# Patient Record
Sex: MALE | Race: WHITE | NOT HISPANIC OR LATINO | Employment: UNEMPLOYED | URBAN - METROPOLITAN AREA
[De-identification: names, ages, dates, MRNs, and addresses within clinical notes are randomized per-mention and may not be internally consistent; named-entity substitution may affect disease eponyms.]

---

## 2024-09-23 ENCOUNTER — HOSPITAL ENCOUNTER (EMERGENCY)
Facility: HOSPITAL | Age: 39
Discharge: HOME | End: 2024-09-23
Attending: EMERGENCY MEDICINE | Admitting: EMERGENCY MEDICINE
Payer: COMMERCIAL

## 2024-09-23 ENCOUNTER — APPOINTMENT (EMERGENCY)
Dept: RADIOLOGY | Facility: HOSPITAL | Age: 39
End: 2024-09-23
Payer: COMMERCIAL

## 2024-09-23 VITALS
BODY MASS INDEX: 33.97 KG/M2 | SYSTOLIC BLOOD PRESSURE: 165 MMHG | RESPIRATION RATE: 18 BRPM | TEMPERATURE: 97.4 F | WEIGHT: 250.5 LBS | HEART RATE: 88 BPM | OXYGEN SATURATION: 97 % | DIASTOLIC BLOOD PRESSURE: 106 MMHG

## 2024-09-23 DIAGNOSIS — J06.9 UPPER RESPIRATORY INFECTION: Primary | ICD-10-CM

## 2024-09-23 LAB
FLUAV AG UPPER RESP QL IA.RAPID: NEGATIVE
FLUBV AG UPPER RESP QL IA.RAPID: NEGATIVE
SARS-COV+SARS-COV-2 AG RESP QL IA.RAPID: NEGATIVE

## 2024-09-23 PROCEDURE — 99283 EMERGENCY DEPT VISIT LOW MDM: CPT

## 2024-09-23 PROCEDURE — 99284 EMERGENCY DEPT VISIT MOD MDM: CPT | Performed by: EMERGENCY MEDICINE

## 2024-09-23 PROCEDURE — 71045 X-RAY EXAM CHEST 1 VIEW: CPT

## 2024-09-23 PROCEDURE — 87811 SARS-COV-2 COVID19 W/OPTIC: CPT | Performed by: EMERGENCY MEDICINE

## 2024-09-23 PROCEDURE — 87804 INFLUENZA ASSAY W/OPTIC: CPT | Performed by: EMERGENCY MEDICINE

## 2024-09-23 RX ORDER — ACETAMINOPHEN 325 MG/1
650 TABLET ORAL ONCE
Status: COMPLETED | OUTPATIENT
Start: 2024-09-23 | End: 2024-09-23

## 2024-09-23 RX ORDER — GUAIFENESIN/DEXTROMETHORPHAN 100-10MG/5
5 SYRUP ORAL 3 TIMES DAILY PRN
Qty: 118 ML | Refills: 0 | Status: SHIPPED | OUTPATIENT
Start: 2024-09-23

## 2024-09-23 RX ORDER — ACETAMINOPHEN 325 MG/1
650 TABLET ORAL EVERY 6 HOURS PRN
Qty: 30 TABLET | Refills: 0 | Status: SHIPPED | OUTPATIENT
Start: 2024-09-23

## 2024-09-23 RX ORDER — GUAIFENESIN/DEXTROMETHORPHAN 100-10MG/5
10 SYRUP ORAL ONCE
Status: COMPLETED | OUTPATIENT
Start: 2024-09-23 | End: 2024-09-23

## 2024-09-23 RX ADMIN — ACETAMINOPHEN 650 MG: 325 TABLET, FILM COATED ORAL at 20:15

## 2024-09-23 RX ADMIN — GUAIFENESIN AND DEXTROMETHORPHAN 10 ML: 100; 10 SYRUP ORAL at 20:15

## 2024-09-23 NOTE — Clinical Note
Shun Linda was seen and treated in our emergency department on 9/23/2024.                Diagnosis:     Shun  may return to work on return date.    He may return on this date: 09/25/2024         If you have any questions or concerns, please don't hesitate to call.      Mylene Sheth, DO    ______________________________           _______________          _______________  Hospital Representative                              Date                                Time

## 2024-09-24 NOTE — ED PROVIDER NOTES
1. Upper respiratory infection      ED Disposition       ED Disposition   Discharge    Condition   Stable    Date/Time   Mon Sep 23, 2024  9:08 PM    Comment   Shun Linda discharge to home/self care.                   Assessment & Plan       Medical Decision Making  Differential diagnosis includes URI, COVID, flu, pneumonia    Problems Addressed:  Upper respiratory infection: acute illness or injury    Amount and/or Complexity of Data Reviewed  Labs: ordered. Decision-making details documented in ED Course.  Radiology: ordered and independent interpretation performed.     Details: Chest x-ray within normal limits no pneumonia    Risk  OTC drugs.  Prescription drug management.  Risk Details: Wrote patient prescription for cough medicine and Tylenol.                     Medications   dextromethorphan-guaiFENesin (ROBITUSSIN DM) oral syrup 10 mL (10 mL Oral Given 9/23/24 2015)   acetaminophen (TYLENOL) tablet 650 mg (650 mg Oral Given 9/23/24 2015)       History of Present Illness       HPI    Review of Systems        Objective     ED Triage Vitals   Temperature Pulse Blood Pressure Respirations SpO2 Patient Position - Orthostatic VS   09/23/24 2002 09/23/24 2002 09/23/24 2002 09/23/24 2002 09/23/24 2002 09/23/24 2002   (!) 97.4 °F (36.3 °C) 88 (!) 165/106 18 97 % Sitting      Temp Source Heart Rate Source BP Location FiO2 (%) Pain Score    09/23/24 2002 09/23/24 2002 09/23/24 2002 -- 09/23/24 2001    Oral Monitor Left arm  No Pain        Physical Exam    Labs Reviewed   COVID-19/INFLUENZA A/B RAPID ANTIGEN (30 MIN.TAT) - Normal       Result Value    SARS COV Rapid Antigen Negative      Influenza A Rapid Antigen Negative      Influenza B Rapid Antigen Negative      Narrative:     This test has been performed using the [employer] [facility] 2 FLU+SARS Antigen test under the Emergency Use Authorization (EUA). This test has been validated by the  and verified by the performing laboratory. The Lynn uses lateral  flow immunofluorescent sandwich assay to detect SARS-COV, Influenza A and Influenza B Antigen.     The Quidel Lynn 2 SARS Antigen test does not differentiate between SARS-CoV and SARS-CoV-2.     Negative results are presumptive and may be confirmed with a molecular assay, if necessary, for patient management. Negative results do not rule out SARS-CoV-2 or influenza infection and should not be used as the sole basis for treatment or patient management decisions. A negative test result may occur if the level of antigen in a sample is below the limit of detection of this test.     Positive results are indicative of the presence of viral antigens, but do not rule out bacterial infection or co-infection with other viruses.     All test results should be used as an adjunct to clinical observations and other information available to the provider.    FOR PEDIATRIC PATIENTS - copy/paste COVID Guidelines URL to browser: [email or web address]     XR chest 1 view portable    (Results Pending)       Procedures    ED Medication and Procedure Management   Prior to Admission Medications   Prescriptions Last Dose Informant Patient Reported? Taking?   ALPRAZolam (XANAX) 2 MG tablet   Yes No   Sig: Take 2 mg by mouth 3 (three) times a day as needed for anxiety.   asenapine (SAPHRIS) 10 mg SL tablet   Yes No   Sig: Place 5 mg under the tongue daily at bedtime as needed.   divalproex sodium (DEPAKOTE ER) 500 mg 24 hr tablet   Yes No   Sig: Take 1,500 mg by mouth daily at bedtime.   lisinopril (ZESTRIL) 5 mg tablet   Yes No   Sig: Take 5 mg by mouth daily.      Facility-Administered Medications: None     Discharge Medication List as of 9/23/2024  9:09 PM        START taking these medications    Details   dextromethorphan-guaiFENesin (ROBITUSSIN DM)  mg/5 mL syrup Take 5 mL by mouth 3 (three) times a day as needed for congestion or cough, Starting Mon 9/23/2024, Normal            CONTINUE these medications which have NOT CHANGED    Details   ALPRAZolam (XANAX) 2 MG tablet Take 2 mg by mouth 3 (three) times a day as needed for anxiety., Until Discontinued, Historical Med      asenapine (SAPHRIS) 10 mg SL tablet Place 5 mg under the tongue daily at bedtime as needed., Historical Med      divalproex sodium (DEPAKOTE ER) 500 mg 24 hr tablet Take 1,500 mg by mouth daily at bedtime., Until Discontinued, Historical Med      lisinopril (ZESTRIL) 5 mg tablet Take 5 mg by mouth daily., Historical Med           No discharge procedures on file.     Mylene Sheth, DO  09/23/24 [id number]

## 2025-06-18 ENCOUNTER — HOSPITAL ENCOUNTER (INPATIENT)
Facility: HOSPITAL | Age: 40
LOS: 2 days | Discharge: HOME | End: 2025-06-20
Attending: EMERGENCY MEDICINE | Admitting: STUDENT IN AN ORGANIZED HEALTH CARE EDUCATION/TRAINING PROGRAM
Payer: COMMERCIAL

## 2025-06-18 ENCOUNTER — APPOINTMENT (EMERGENCY)
Dept: ULTRASOUND IMAGING | Facility: HOSPITAL | Age: 40
End: 2025-06-18
Payer: COMMERCIAL

## 2025-06-18 ENCOUNTER — APPOINTMENT (EMERGENCY)
Dept: CT IMAGING | Facility: HOSPITAL | Age: 40
End: 2025-06-18
Payer: COMMERCIAL

## 2025-06-18 DIAGNOSIS — E11.9 DIABETES (HCC): ICD-10-CM

## 2025-06-18 DIAGNOSIS — E78.1 HYPERTRIGLYCERIDEMIA: ICD-10-CM

## 2025-06-18 DIAGNOSIS — K21.9 GERD (GASTROESOPHAGEAL REFLUX DISEASE): ICD-10-CM

## 2025-06-18 DIAGNOSIS — E11.10 DKA (DIABETIC KETOACIDOSIS) (HCC): Primary | ICD-10-CM

## 2025-06-18 DIAGNOSIS — D68.51 FACTOR 5 LEIDEN MUTATION, HETEROZYGOUS (HCC): Chronic | ICD-10-CM

## 2025-06-18 DIAGNOSIS — I10 PRIMARY HYPERTENSION: Chronic | ICD-10-CM

## 2025-06-18 PROBLEM — E87.20 METABOLIC ACIDOSIS: Status: ACTIVE | Noted: 2025-06-18

## 2025-06-18 PROBLEM — E87.1 HYPONATREMIA: Status: ACTIVE | Noted: 2025-06-18

## 2025-06-18 PROBLEM — F14.90 COCAINE USE: Status: ACTIVE | Noted: 2025-06-18

## 2025-06-18 PROBLEM — F14.90 COCAINE USE: Chronic | Status: ACTIVE | Noted: 2025-06-18

## 2025-06-18 PROBLEM — R10.9 ABDOMINAL PAIN: Status: ACTIVE | Noted: 2025-06-18

## 2025-06-18 PROBLEM — K76.0 HEPATIC STEATOSIS: Status: ACTIVE | Noted: 2025-06-18

## 2025-06-18 PROBLEM — E66.9 OBESITY: Status: ACTIVE | Noted: 2025-06-18

## 2025-06-18 PROBLEM — E66.9 OBESITY: Chronic | Status: ACTIVE | Noted: 2025-06-18

## 2025-06-18 PROBLEM — D72.829 LEUKOCYTOSIS: Status: ACTIVE | Noted: 2025-06-18

## 2025-06-18 LAB
ANION GAP SERPL CALCULATED.3IONS-SCNC: 11 MMOL/L (ref 4–13)
ANION GAP SERPL CALCULATED.3IONS-SCNC: 12 MMOL/L (ref 4–13)
ANION GAP SERPL CALCULATED.3IONS-SCNC: 15 MMOL/L (ref 4–13)
ANION GAP SERPL CALCULATED.3IONS-SCNC: 20 MMOL/L (ref 4–13)
B-OH-BUTYR SERPL-MCNC: 4.71 MMOL/L (ref 0.2–0.6)
BACTERIA UR QL AUTO: NORMAL /HPF
BASE EX.OXY STD BLDV CALC-SCNC: 95.8 % (ref 60–80)
BASE EXCESS BLDV CALC-SCNC: -11.6 MMOL/L
BASOPHILS # BLD AUTO: 0.14 THOUSANDS/ÂΜL (ref 0–0.1)
BASOPHILS NFR BLD AUTO: 1 % (ref 0–1)
BILIRUB UR QL STRIP: ABNORMAL
BUN SERPL-MCNC: 11 MG/DL (ref 5–25)
BUN SERPL-MCNC: 12 MG/DL (ref 5–25)
CALCIUM SERPL-MCNC: 8.3 MG/DL (ref 8.4–10.2)
CALCIUM SERPL-MCNC: 8.9 MG/DL (ref 8.4–10.2)
CALCIUM SERPL-MCNC: 8.9 MG/DL (ref 8.4–10.2)
CALCIUM SERPL-MCNC: 9.3 MG/DL (ref 8.4–10.2)
CHLORIDE SERPL-SCNC: 102 MMOL/L (ref 96–108)
CHLORIDE SERPL-SCNC: 104 MMOL/L (ref 96–108)
CHLORIDE SERPL-SCNC: 104 MMOL/L (ref 96–108)
CHLORIDE SERPL-SCNC: 99 MMOL/L (ref 96–108)
CHOLEST SERPL-MCNC: 277 MG/DL (ref ?–200)
CLARITY UR: CLEAR
CO2 SERPL-SCNC: 13 MMOL/L (ref 21–32)
CO2 SERPL-SCNC: 14 MMOL/L (ref 21–32)
CO2 SERPL-SCNC: 17 MMOL/L (ref 21–32)
CO2 SERPL-SCNC: 18 MMOL/L (ref 21–32)
COLOR UR: YELLOW
CREAT SERPL-MCNC: 0.98 MG/DL (ref 0.6–1.3)
CREAT SERPL-MCNC: 1.02 MG/DL (ref 0.6–1.3)
CREAT SERPL-MCNC: 1.06 MG/DL (ref 0.6–1.3)
CREAT SERPL-MCNC: 1.18 MG/DL (ref 0.6–1.3)
EOSINOPHIL # BLD AUTO: 0.01 THOUSAND/ÂΜL (ref 0–0.61)
EOSINOPHIL NFR BLD AUTO: 0 % (ref 0–6)
ERYTHROCYTE [DISTWIDTH] IN BLOOD BY AUTOMATED COUNT: 11.5 % (ref 11.6–15.1)
EST. AVERAGE GLUCOSE BLD GHB EST-MCNC: 286 MG/DL
GFR SERPL CREATININE-BSD FRML MDRD: 76 ML/MIN/1.73SQ M
GFR SERPL CREATININE-BSD FRML MDRD: 86 ML/MIN/1.73SQ M
GFR SERPL CREATININE-BSD FRML MDRD: 90 ML/MIN/1.73SQ M
GFR SERPL CREATININE-BSD FRML MDRD: 95 ML/MIN/1.73SQ M
GLUCOSE SERPL-MCNC: 109 MG/DL (ref 65–140)
GLUCOSE SERPL-MCNC: 130 MG/DL (ref 65–140)
GLUCOSE SERPL-MCNC: 156 MG/DL (ref 65–140)
GLUCOSE SERPL-MCNC: 190 MG/DL (ref 65–140)
GLUCOSE SERPL-MCNC: 190 MG/DL (ref 65–140)
GLUCOSE SERPL-MCNC: 193 MG/DL (ref 65–140)
GLUCOSE SERPL-MCNC: 195 MG/DL (ref 65–140)
GLUCOSE SERPL-MCNC: 201 MG/DL (ref 65–140)
GLUCOSE SERPL-MCNC: 203 MG/DL (ref 65–140)
GLUCOSE SERPL-MCNC: 209 MG/DL (ref 65–140)
GLUCOSE SERPL-MCNC: 215 MG/DL (ref 65–140)
GLUCOSE SERPL-MCNC: 243 MG/DL (ref 65–140)
GLUCOSE SERPL-MCNC: 267 MG/DL (ref 65–140)
GLUCOSE SERPL-MCNC: 268 MG/DL (ref 65–140)
GLUCOSE SERPL-MCNC: 370 MG/DL (ref 65–140)
GLUCOSE SERPL-MCNC: 371 MG/DL (ref 65–140)
GLUCOSE SERPL-MCNC: 76 MG/DL (ref 65–140)
GLUCOSE SERPL-MCNC: 79 MG/DL (ref 65–140)
GLUCOSE SERPL-MCNC: 81 MG/DL (ref 65–140)
GLUCOSE UR STRIP-MCNC: ABNORMAL MG/DL
HBA1C MFR BLD: 11.6 %
HCO3 BLDV-SCNC: 13.3 MMOL/L (ref 24–30)
HCT VFR BLD AUTO: 47 % (ref 36.5–49.3)
HDLC SERPL-MCNC: 23 MG/DL
HGB BLD-MCNC: 16.3 G/DL (ref 12–17)
HGB UR QL STRIP.AUTO: ABNORMAL
IMM GRANULOCYTES # BLD AUTO: 0.19 THOUSAND/UL (ref 0–0.2)
IMM GRANULOCYTES NFR BLD AUTO: 1 % (ref 0–2)
KETONES UR STRIP-MCNC: ABNORMAL MG/DL
LACTATE SERPL-SCNC: 1.9 MMOL/L (ref 0.5–2)
LEUKOCYTE ESTERASE UR QL STRIP: NEGATIVE
LIPASE SERPL-CCNC: 74 U/L (ref 11–82)
LYMPHOCYTES # BLD AUTO: 2.13 THOUSANDS/ÂΜL (ref 0.6–4.47)
LYMPHOCYTES NFR BLD AUTO: 13 % (ref 14–44)
MAGNESIUM SERPL-MCNC: 1.8 MG/DL (ref 1.9–2.7)
MAGNESIUM SERPL-MCNC: 1.9 MG/DL (ref 1.9–2.7)
MAGNESIUM SERPL-MCNC: 2 MG/DL (ref 1.9–2.7)
MCH RBC QN AUTO: 31.5 PG (ref 26.8–34.3)
MCHC RBC AUTO-ENTMCNC: 34.7 G/DL (ref 31.4–37.4)
MCV RBC AUTO: 91 FL (ref 82–98)
MONOCYTES # BLD AUTO: 1.15 THOUSAND/ÂΜL (ref 0.17–1.22)
MONOCYTES NFR BLD AUTO: 7 % (ref 4–12)
NEUTROPHILS # BLD AUTO: 12.6 THOUSANDS/ÂΜL (ref 1.85–7.62)
NEUTS SEG NFR BLD AUTO: 78 % (ref 43–75)
NITRITE UR QL STRIP: NEGATIVE
NON-SQ EPI CELLS URNS QL MICRO: NORMAL /HPF
NONHDLC SERPL-MCNC: 254 MG/DL
NRBC BLD AUTO-RTO: 0 /100 WBCS
O2 CT BLDV-SCNC: 22.7 ML/DL
PCO2 BLDV: 28.8 MM HG (ref 42–50)
PH BLDV: 7.28 [PH] (ref 7.3–7.4)
PH UR STRIP.AUTO: 6 [PH]
PHOSPHATE SERPL-MCNC: 2.3 MG/DL (ref 2.7–4.5)
PHOSPHATE SERPL-MCNC: 2.6 MG/DL (ref 2.7–4.5)
PHOSPHATE SERPL-MCNC: 3.3 MG/DL (ref 2.7–4.5)
PLATELET # BLD AUTO: 360 THOUSANDS/UL (ref 149–390)
PMV BLD AUTO: 11.1 FL (ref 8.9–12.7)
PO2 BLDV: 107.9 MM HG (ref 35–45)
POTASSIUM SERPL-SCNC: 3.6 MMOL/L (ref 3.5–5.3)
POTASSIUM SERPL-SCNC: 3.8 MMOL/L (ref 3.5–5.3)
POTASSIUM SERPL-SCNC: 4.1 MMOL/L (ref 3.5–5.3)
POTASSIUM SERPL-SCNC: 4.3 MMOL/L (ref 3.5–5.3)
PROCALCITONIN SERPL-MCNC: 0.11 NG/ML
PROT UR STRIP-MCNC: ABNORMAL MG/DL
RBC # BLD AUTO: 5.18 MILLION/UL (ref 3.88–5.62)
RBC #/AREA URNS AUTO: NORMAL /HPF
SODIUM SERPL-SCNC: 131 MMOL/L (ref 135–147)
SODIUM SERPL-SCNC: 132 MMOL/L (ref 135–147)
SODIUM SERPL-SCNC: 133 MMOL/L (ref 135–147)
SODIUM SERPL-SCNC: 133 MMOL/L (ref 135–147)
SP GR UR STRIP.AUTO: >=1.03
TRIGL SERPL-MCNC: 1249 MG/DL (ref ?–150)
UROBILINOGEN UR QL STRIP.AUTO: 0.2 E.U./DL
WBC # BLD AUTO: 16.22 THOUSAND/UL (ref 4.31–10.16)
WBC #/AREA URNS AUTO: NORMAL /HPF

## 2025-06-18 PROCEDURE — 96361 HYDRATE IV INFUSION ADD-ON: CPT

## 2025-06-18 PROCEDURE — 99284 EMERGENCY DEPT VISIT MOD MDM: CPT

## 2025-06-18 PROCEDURE — 76870 US EXAM SCROTUM: CPT

## 2025-06-18 PROCEDURE — 87040 BLOOD CULTURE FOR BACTERIA: CPT | Performed by: NURSE PRACTITIONER

## 2025-06-18 PROCEDURE — 80048 BASIC METABOLIC PNL TOTAL CA: CPT | Performed by: EMERGENCY MEDICINE

## 2025-06-18 PROCEDURE — 80061 LIPID PANEL: CPT | Performed by: NURSE PRACTITIONER

## 2025-06-18 PROCEDURE — 83036 HEMOGLOBIN GLYCOSYLATED A1C: CPT | Performed by: NURSE PRACTITIONER

## 2025-06-18 PROCEDURE — 80048 BASIC METABOLIC PNL TOTAL CA: CPT | Performed by: NURSE PRACTITIONER

## 2025-06-18 PROCEDURE — 83735 ASSAY OF MAGNESIUM: CPT | Performed by: NURSE PRACTITIONER

## 2025-06-18 PROCEDURE — 74176 CT ABD & PELVIS W/O CONTRAST: CPT

## 2025-06-18 PROCEDURE — 82805 BLOOD GASES W/O2 SATURATION: CPT | Performed by: EMERGENCY MEDICINE

## 2025-06-18 PROCEDURE — 85025 COMPLETE CBC W/AUTO DIFF WBC: CPT | Performed by: EMERGENCY MEDICINE

## 2025-06-18 PROCEDURE — 99291 CRITICAL CARE FIRST HOUR: CPT | Performed by: STUDENT IN AN ORGANIZED HEALTH CARE EDUCATION/TRAINING PROGRAM

## 2025-06-18 PROCEDURE — 96374 THER/PROPH/DIAG INJ IV PUSH: CPT

## 2025-06-18 PROCEDURE — 82948 REAGENT STRIP/BLOOD GLUCOSE: CPT

## 2025-06-18 PROCEDURE — 82010 KETONE BODYS QUAN: CPT | Performed by: EMERGENCY MEDICINE

## 2025-06-18 PROCEDURE — 84145 PROCALCITONIN (PCT): CPT | Performed by: NURSE PRACTITIONER

## 2025-06-18 PROCEDURE — 81003 URINALYSIS AUTO W/O SCOPE: CPT | Performed by: EMERGENCY MEDICINE

## 2025-06-18 PROCEDURE — 83605 ASSAY OF LACTIC ACID: CPT | Performed by: EMERGENCY MEDICINE

## 2025-06-18 PROCEDURE — 84100 ASSAY OF PHOSPHORUS: CPT | Performed by: NURSE PRACTITIONER

## 2025-06-18 PROCEDURE — 83690 ASSAY OF LIPASE: CPT | Performed by: NURSE PRACTITIONER

## 2025-06-18 PROCEDURE — 99285 EMERGENCY DEPT VISIT HI MDM: CPT | Performed by: EMERGENCY MEDICINE

## 2025-06-18 PROCEDURE — 96375 TX/PRO/DX INJ NEW DRUG ADDON: CPT

## 2025-06-18 PROCEDURE — 81001 URINALYSIS AUTO W/SCOPE: CPT | Performed by: EMERGENCY MEDICINE

## 2025-06-18 PROCEDURE — 36415 COLL VENOUS BLD VENIPUNCTURE: CPT | Performed by: EMERGENCY MEDICINE

## 2025-06-18 RX ORDER — ONDANSETRON 2 MG/ML
4 INJECTION INTRAMUSCULAR; INTRAVENOUS ONCE
Status: COMPLETED | OUTPATIENT
Start: 2025-06-18 | End: 2025-06-18

## 2025-06-18 RX ORDER — ATORVASTATIN CALCIUM 40 MG/1
40 TABLET, FILM COATED ORAL
Status: DISCONTINUED | OUTPATIENT
Start: 2025-06-18 | End: 2025-06-18

## 2025-06-18 RX ORDER — HYDROMORPHONE HCL/PF 1 MG/ML
1 SYRINGE (ML) INJECTION
Status: DISCONTINUED | OUTPATIENT
Start: 2025-06-18 | End: 2025-06-18

## 2025-06-18 RX ORDER — HYDROMORPHONE HCL/PF 1 MG/ML
1 SYRINGE (ML) INJECTION EVERY 4 HOURS PRN
Status: DISCONTINUED | OUTPATIENT
Start: 2025-06-18 | End: 2025-06-19

## 2025-06-18 RX ORDER — KETOROLAC TROMETHAMINE 30 MG/ML
30 INJECTION, SOLUTION INTRAMUSCULAR; INTRAVENOUS EVERY 6 HOURS SCHEDULED
Status: DISCONTINUED | OUTPATIENT
Start: 2025-06-18 | End: 2025-06-18

## 2025-06-18 RX ORDER — FENTANYL CITRATE 50 UG/ML
50 INJECTION, SOLUTION INTRAMUSCULAR; INTRAVENOUS ONCE
Refills: 0 | Status: COMPLETED | OUTPATIENT
Start: 2025-06-18 | End: 2025-06-18

## 2025-06-18 RX ORDER — OXYCODONE HYDROCHLORIDE 5 MG/1
5 TABLET ORAL EVERY 4 HOURS PRN
Refills: 0 | Status: DISCONTINUED | OUTPATIENT
Start: 2025-06-18 | End: 2025-06-19

## 2025-06-18 RX ORDER — LANOLIN ALCOHOL/MO/W.PET/CERES
800 CREAM (GRAM) TOPICAL ONCE
Status: COMPLETED | OUTPATIENT
Start: 2025-06-18 | End: 2025-06-18

## 2025-06-18 RX ORDER — KETOROLAC TROMETHAMINE 30 MG/ML
30 INJECTION, SOLUTION INTRAMUSCULAR; INTRAVENOUS ONCE
Status: COMPLETED | OUTPATIENT
Start: 2025-06-18 | End: 2025-06-18

## 2025-06-18 RX ORDER — POTASSIUM CHLORIDE 1500 MG/1
40 TABLET, EXTENDED RELEASE ORAL ONCE
Status: COMPLETED | OUTPATIENT
Start: 2025-06-18 | End: 2025-06-18

## 2025-06-18 RX ORDER — ACETAMINOPHEN 10 MG/ML
1000 INJECTION, SOLUTION INTRAVENOUS EVERY 6 HOURS SCHEDULED
Status: DISCONTINUED | OUTPATIENT
Start: 2025-06-18 | End: 2025-06-19

## 2025-06-18 RX ORDER — KETOROLAC TROMETHAMINE 30 MG/ML
15 INJECTION, SOLUTION INTRAMUSCULAR; INTRAVENOUS ONCE
Status: COMPLETED | OUTPATIENT
Start: 2025-06-18 | End: 2025-06-18

## 2025-06-18 RX ORDER — ONDANSETRON 2 MG/ML
4 INJECTION INTRAMUSCULAR; INTRAVENOUS EVERY 6 HOURS PRN
Status: DISCONTINUED | OUTPATIENT
Start: 2025-06-18 | End: 2025-06-20 | Stop reason: HOSPADM

## 2025-06-18 RX ORDER — HYDROMORPHONE HCL/PF 1 MG/ML
0.5 SYRINGE (ML) INJECTION ONCE
Status: COMPLETED | OUTPATIENT
Start: 2025-06-18 | End: 2025-06-18

## 2025-06-18 RX ORDER — POTASSIUM CHLORIDE 1500 MG/1
20 TABLET, EXTENDED RELEASE ORAL ONCE
Status: COMPLETED | OUTPATIENT
Start: 2025-06-18 | End: 2025-06-18

## 2025-06-18 RX ORDER — ACETAMINOPHEN 10 MG/ML
1000 INJECTION, SOLUTION INTRAVENOUS ONCE
Status: COMPLETED | OUTPATIENT
Start: 2025-06-18 | End: 2025-06-18

## 2025-06-18 RX ORDER — FENOFIBRATE 145 MG/1
145 TABLET, FILM COATED ORAL DAILY
Status: DISCONTINUED | OUTPATIENT
Start: 2025-06-18 | End: 2025-06-18

## 2025-06-18 RX ORDER — FENTANYL CITRATE 50 UG/ML
25 INJECTION, SOLUTION INTRAMUSCULAR; INTRAVENOUS ONCE
Refills: 0 | Status: COMPLETED | OUTPATIENT
Start: 2025-06-18 | End: 2025-06-18

## 2025-06-18 RX ORDER — DEXTROSE, SODIUM CHLORIDE, SODIUM LACTATE, POTASSIUM CHLORIDE, AND CALCIUM CHLORIDE 5; .6; .31; .03; .02 G/100ML; G/100ML; G/100ML; G/100ML; G/100ML
250 INJECTION, SOLUTION INTRAVENOUS CONTINUOUS
Status: DISCONTINUED | OUTPATIENT
Start: 2025-06-18 | End: 2025-06-18

## 2025-06-18 RX ORDER — KETOROLAC TROMETHAMINE 30 MG/ML
30 INJECTION, SOLUTION INTRAMUSCULAR; INTRAVENOUS EVERY 6 HOURS SCHEDULED
Status: DISCONTINUED | OUTPATIENT
Start: 2025-06-18 | End: 2025-06-19

## 2025-06-18 RX ORDER — SODIUM CHLORIDE, SODIUM LACTATE, POTASSIUM CHLORIDE, CALCIUM CHLORIDE 600; 310; 30; 20 MG/100ML; MG/100ML; MG/100ML; MG/100ML
500 INJECTION, SOLUTION INTRAVENOUS CONTINUOUS
Status: DISCONTINUED | OUTPATIENT
Start: 2025-06-18 | End: 2025-06-18

## 2025-06-18 RX ORDER — SODIUM CHLORIDE, SODIUM LACTATE, POTASSIUM CHLORIDE, CALCIUM CHLORIDE 600; 310; 30; 20 MG/100ML; MG/100ML; MG/100ML; MG/100ML
250 INJECTION, SOLUTION INTRAVENOUS CONTINUOUS
Status: DISCONTINUED | OUTPATIENT
Start: 2025-06-18 | End: 2025-06-18

## 2025-06-18 RX ORDER — SODIUM CHLORIDE, SODIUM GLUCONATE, SODIUM ACETATE, POTASSIUM CHLORIDE, MAGNESIUM CHLORIDE, SODIUM PHOSPHATE, DIBASIC, AND POTASSIUM PHOSPHATE .53; .5; .37; .037; .03; .012; .00082 G/100ML; G/100ML; G/100ML; G/100ML; G/100ML; G/100ML; G/100ML
75 INJECTION, SOLUTION INTRAVENOUS CONTINUOUS
Status: DISCONTINUED | OUTPATIENT
Start: 2025-06-18 | End: 2025-06-19

## 2025-06-18 RX ORDER — CHLORHEXIDINE GLUCONATE ORAL RINSE 1.2 MG/ML
15 SOLUTION DENTAL EVERY 12 HOURS SCHEDULED
Status: DISCONTINUED | OUTPATIENT
Start: 2025-06-18 | End: 2025-06-19

## 2025-06-18 RX ADMIN — ACETAMINOPHEN 1000 MG: 10 INJECTION INTRAVENOUS at 12:57

## 2025-06-18 RX ADMIN — FENTANYL CITRATE 25 MCG: 50 INJECTION INTRAMUSCULAR; INTRAVENOUS at 05:22

## 2025-06-18 RX ADMIN — Medication 2 TABLET: at 13:38

## 2025-06-18 RX ADMIN — HYDROMORPHONE HYDROCHLORIDE 1 MG: 1 INJECTION, SOLUTION INTRAMUSCULAR; INTRAVENOUS; SUBCUTANEOUS at 11:15

## 2025-06-18 RX ADMIN — SODIUM CHLORIDE, SODIUM LACTATE, POTASSIUM CHLORIDE, AND CALCIUM CHLORIDE 500 ML/HR: .6; .31; .03; .02 INJECTION, SOLUTION INTRAVENOUS at 10:23

## 2025-06-18 RX ADMIN — HYDROMORPHONE HYDROCHLORIDE 0.5 MG: 1 INJECTION, SOLUTION INTRAMUSCULAR; INTRAVENOUS; SUBCUTANEOUS at 07:16

## 2025-06-18 RX ADMIN — SODIUM CHLORIDE 1000 ML: 0.9 INJECTION, SOLUTION INTRAVENOUS at 07:06

## 2025-06-18 RX ADMIN — DEXTROSE, SODIUM CHLORIDE, SODIUM LACTATE, POTASSIUM CHLORIDE, AND CALCIUM CHLORIDE 250 ML/HR: 5; .6; .31; .03; .02 INJECTION, SOLUTION INTRAVENOUS at 09:34

## 2025-06-18 RX ADMIN — KETOROLAC TROMETHAMINE 30 MG: 30 INJECTION, SOLUTION INTRAMUSCULAR; INTRAVENOUS at 21:03

## 2025-06-18 RX ADMIN — HYDROMORPHONE HYDROCHLORIDE 1 MG: 1 INJECTION, SOLUTION INTRAMUSCULAR; INTRAVENOUS; SUBCUTANEOUS at 22:04

## 2025-06-18 RX ADMIN — Medication 800 MG: at 11:12

## 2025-06-18 RX ADMIN — FENOFIBRATE 145 MG: 145 TABLET, FILM COATED ORAL at 12:56

## 2025-06-18 RX ADMIN — SODIUM CHLORIDE, SODIUM GLUCONATE, SODIUM ACETATE, POTASSIUM CHLORIDE, MAGNESIUM CHLORIDE, SODIUM PHOSPHATE, DIBASIC, AND POTASSIUM PHOSPHATE 75 ML/HR: .53; .5; .37; .037; .03; .012; .00082 INJECTION, SOLUTION INTRAVENOUS at 18:50

## 2025-06-18 RX ADMIN — SODIUM CHLORIDE 12.7 UNITS/HR: 9 INJECTION, SOLUTION INTRAVENOUS at 08:22

## 2025-06-18 RX ADMIN — HYDROMORPHONE HYDROCHLORIDE 1 MG: 1 INJECTION, SOLUTION INTRAMUSCULAR; INTRAVENOUS; SUBCUTANEOUS at 08:20

## 2025-06-18 RX ADMIN — SODIUM CHLORIDE 1000 ML: 0.9 INJECTION, SOLUTION INTRAVENOUS at 05:23

## 2025-06-18 RX ADMIN — POTASSIUM CHLORIDE 40 MEQ: 1500 TABLET, EXTENDED RELEASE ORAL at 13:38

## 2025-06-18 RX ADMIN — FENTANYL CITRATE 50 MCG: 50 INJECTION INTRAMUSCULAR; INTRAVENOUS at 05:42

## 2025-06-18 RX ADMIN — KETOROLAC TROMETHAMINE 15 MG: 30 INJECTION, SOLUTION INTRAMUSCULAR; INTRAVENOUS at 05:22

## 2025-06-18 RX ADMIN — ATORVASTATIN CALCIUM 40 MG: 40 TABLET, FILM COATED ORAL at 17:12

## 2025-06-18 RX ADMIN — Medication 800 MG: at 17:12

## 2025-06-18 RX ADMIN — OXYCODONE HYDROCHLORIDE 5 MG: 5 TABLET ORAL at 18:14

## 2025-06-18 RX ADMIN — HYDROMORPHONE HYDROCHLORIDE 1 MG: 1 INJECTION, SOLUTION INTRAMUSCULAR; INTRAVENOUS; SUBCUTANEOUS at 14:13

## 2025-06-18 RX ADMIN — KETOROLAC TROMETHAMINE 30 MG: 30 INJECTION, SOLUTION INTRAMUSCULAR; INTRAVENOUS at 13:04

## 2025-06-18 RX ADMIN — DEXTROSE, SODIUM CHLORIDE, SODIUM LACTATE, POTASSIUM CHLORIDE, AND CALCIUM CHLORIDE 250 ML/HR: 5; .6; .31; .03; .02 INJECTION, SOLUTION INTRAVENOUS at 14:12

## 2025-06-18 RX ADMIN — SODIUM PHOSPHATE, MONOBASIC, MONOHYDRATE AND SODIUM PHOSPHATE, DIBASIC, ANHYDROUS 12 MMOL: 142; 276 INJECTION, SOLUTION INTRAVENOUS at 14:12

## 2025-06-18 RX ADMIN — POTASSIUM CHLORIDE 20 MEQ: 1500 TABLET, EXTENDED RELEASE ORAL at 17:12

## 2025-06-18 RX ADMIN — SODIUM CHLORIDE, SODIUM LACTATE, POTASSIUM CHLORIDE, AND CALCIUM CHLORIDE 250 ML/HR: .6; .31; .03; .02 INJECTION, SOLUTION INTRAVENOUS at 15:35

## 2025-06-18 RX ADMIN — POTASSIUM CHLORIDE 20 MEQ: 1500 TABLET, EXTENDED RELEASE ORAL at 11:12

## 2025-06-18 RX ADMIN — ONDANSETRON 4 MG: 2 INJECTION INTRAMUSCULAR; INTRAVENOUS at 05:22

## 2025-06-18 RX ADMIN — INSULIN HUMAN 6 UNITS: 100 INJECTION, SOLUTION PARENTERAL at 06:24

## 2025-06-18 RX ADMIN — Medication 800 MG: at 13:38

## 2025-06-18 RX ADMIN — CHLORHEXIDINE GLUCONATE 15 ML: 1.2 SOLUTION ORAL at 20:05

## 2025-06-18 RX ADMIN — SODIUM CHLORIDE, SODIUM LACTATE, POTASSIUM CHLORIDE, AND CALCIUM CHLORIDE 500 ML/HR: .6; .31; .03; .02 INJECTION, SOLUTION INTRAVENOUS at 07:58

## 2025-06-18 RX ADMIN — ACETAMINOPHEN 1000 MG: 10 INJECTION INTRAVENOUS at 20:06

## 2025-06-18 NOTE — ASSESSMENT & PLAN NOTE
Right side/flank radiating to scrotum  UA 2+blood 3+ketone 2+ protein  Consider kidney stone  LA 1.9  Hydrate/pain mgmnt/strain urine

## 2025-06-18 NOTE — ASSESSMENT & PLAN NOTE
Lab Results   Component Value Date    HGBA1C 5.6 04/16/2019       Recent Labs     06/18/25  0723 06/18/25  0814   POCGLU 370* 268*       Blood Sugar Average: Last 72 hrs:  (P) 319  New onset  BHB 4.71  Check A1C/PIYUSH/islet cell antibx  DKA protocol   Insulin gtt  Serial bmp  Transition to non CC insulin gtt once gap closed x 2

## 2025-06-18 NOTE — H&P
H&P - Critical Care/ICU   Name: Shun Linda 39 y.o. male I MRN: [id number]  Unit/Bed#: ICU 10-01 I Date of Admission: 6/18/2025   Date of Service: 6/18/2025 I Hospital Day: 0       Assessment & Plan  DKA (diabetic ketoacidosis) (HCC)  Lab Results   Component Value Date    HGBA1C 5.6 04/16/2019       Recent Labs     06/18/25  0723 06/18/25  0814   POCGLU 370* 268*       Blood Sugar Average: Last 72 hrs:  (P) 319  New onset  BHB 4.71  Check A1C/PIYUSH/islet cell antibx  DKA protocol   Insulin gtt  Serial bmp  Transition to non CC insulin gtt once gap closed x 2  Abdominal pain  Right side/flank radiating to scrotum  UA 2+blood 3+ketone 2+ protein  Consider kidney stone  LA 1.9  Hydrate/pain mgmnt/strain urine    Hyponatremia  Pseudo  Corrected 136  Metabolic acidosis  2/2 DKA  Trend bmp  Leukocytosis  Likely reactionary  No source  UA pending  CTAP neg  Check BC/PCT monitor off antibx  Cocaine use  Remote  Snorted cocaine this am  Last use 1 year ago  HTN (hypertension)  Currently not taking lisinopril/metoprolol for months  Factor 5 Leiden mutation, heterozygous (HCC)  Hx DVT on eliquis noncompliant x few months will resume  Obesity  Dietary education  Disposition: Stepdown Level 1    History of Present Illness   Shun Linda is a 39 y.o. with PMH HTN, Factor 5 Leiden, drug abuse who presents with acute onset right sided pain radiating to scrotum. Scrotal u/s and CTAP negative. Labs revealed co2 13  BHB 4.7 pH 7.28. Given insulin bolus and CC asked to evaluate patient.     Pt denies fever/chills/nausea/diarrhea/constipation. +vomiting/abd pain/increased thirst drinking 3L soda per day    History obtained from chart review and the patient.  Review of Systems: Review of Systems   Constitutional:  Negative for chills, fatigue, fever and unexpected weight change.   Gastrointestinal:  Positive for abdominal pain and vomiting. Negative for nausea.   Genitourinary:  Positive for testicular pain.    Musculoskeletal:  Positive for back pain.   Neurological: Negative.        Historical Information   Past Medical History:  No date: Anxiety  No date: Bipolar affective disorder (HCC)  No date: Hypertension Past Surgical History:  No date: HERNIA REPAIR  No date: MANDIBLE FRACTURE SURGERY  No date: ORTHOPEDIC SURGERY   Current Outpatient Medications   Medication Instructions    acetaminophen (TYLENOL) 650 mg, Oral, Every 6 hours PRN    ALPRAZolam (XANAX) 2 mg, Oral, 3 times daily PRN    asenapine (SAPHRIS) 5 mg, Sublingual, Daily at bedtime PRN    dextromethorphan-guaiFENesin (ROBITUSSIN DM)  mg/5 mL syrup 5 mL, Oral, 3 times daily PRN    divalproex sodium (DEPAKOTE ER) 1,500 mg, Oral, Daily at bedtime    lisinopril (ZESTRIL) 5 mg, Oral, Daily    Allergies[1]   Social History[2] Family History[3]       Objective :                   Vitals I/O      Most Recent Min/Max in 24hrs   Temp 98.3 °F (36.8 °C) Temp  Min: 98.3 °F (36.8 °C)  Max: 98.3 °F (36.8 °C)   Pulse 105 Pulse  Min: 102  Max: 110   Resp 16 Resp  Min: 16  Max: 20   /97 BP  Min: 156/97  Max: 156/97   O2 Sat 92 % SpO2  Min: 92 %  Max: 94 %    No intake or output data in the 24 hours ending 06/18/25 0851    Diet NPO; Sips of clear liquids    Invasive Monitoring           Physical Exam   Physical Exam  Eyes:      Pupils: Pupils are equal, round, and reactive to light.   Skin:     General: Skin is warm and dry.   HENT:      Head: Normocephalic and atraumatic.      Mouth/Throat:      Mouth: Mucous membranes are moist.   Cardiovascular:      Rate and Rhythm: Normal rate and regular rhythm.      Pulses: Normal pulses.      Heart sounds: Normal heart sounds.   Musculoskeletal:         General: No swelling. Normal range of motion.      Right lower leg: No edema.      Left lower leg: No edema.   Abdominal: General: Bowel sounds are normal. There is no distension.      Palpations: Abdomen is soft.      Tenderness: There is no abdominal tenderness.    Constitutional:       General: He is not in acute distress.  Pulmonary:      Effort: Pulmonary effort is normal. No respiratory distress.      Breath sounds: Normal breath sounds.   Neurological:      General: No focal deficit present.      Mental Status: He is alert and oriented to person, place and time.      GCS: GCS eye subscore is 4. GCS verbal subscore is 5. GCS motor subscore is 6.      Motor: Strength full and intact in all extremities. No motor deficit.          Diagnostic Studies        Lab Results: I have reviewed the following results:     Medications:  Scheduled PRN   chlorhexidine, 15 mL, Q12H SAMANTHA      HYDROmorphone, 1 mg, Q3H PRN       Continuous    dextrose 5% lactated ringer's, 250 mL/hr  insulin regular (HumuLIN R,NovoLIN R) 1 Units/mL in sodium chloride 0.9 % 100 mL infusion, 0.1-30 Units/hr, Last Rate: 12.7 Units/hr (06/18/25 0822)  lactated ringers, 500 mL/hr, Last Rate: 500 mL/hr (06/18/25 0802)   Followed by  lactated ringers, 250 mL/hr         Labs:   CBC    Recent Labs     06/18/25  0523   WBC 16.22*   HGB 16.3   HCT 47.0        BMP    Recent Labs     06/18/25  0523   SODIUM 132*   K 4.3   CL 99   CO2 13*   AGAP 20*   BUN 12   CREATININE 1.18   CALCIUM 9.3       Coags    No recent results     Additional Electrolytes  No recent results       Blood Gas    No recent results  Recent Labs     06/18/25  0601   PHVEN 7.283*   UIO8YUI 28.8*   PO2VEN 107.9*   LSC0GYU 13.3*   BEVEN -11.6   R0YVEKM 95.8*    LFTs  No recent results    Infectious  Recent Labs     06/18/25  0523   PROCALCITONI 0.11     Glucose  Recent Labs     06/18/25  0523   GLUC 371*        Administrative Statements          [1]   Allergies  Allergen Reactions    Azithromycin     Erythromycin    [2]   Social History  Tobacco Use    Smoking status: Former     Current packs/day: 0.25     Types: Cigarettes   Vaping Use    Vaping status: Every Day    Substances: Nicotine   Substance Use Topics    Alcohol use: Not Currently    Drug  use: Yes     Types: Cocaine   [3] No family history on file.

## 2025-06-18 NOTE — ED PROVIDER NOTES
Time reflects when diagnosis was documented in both MDM as applicable and the Disposition within this note       Time User Action Codes Description Comment    6/18/2025  6:55 AM Devante Almazan Add [E11.10] DKA (diabetic ketoacidosis) (HCC)     6/19/2025  7:39 AM Tori Dorsey Add [E78.1] Hypertriglyceridemia           ED Disposition       ED Disposition   Admit    Condition   Stable    Date/Time   Wed Jun 18, 2025  6:55 AM    Comment   Case was discussed with CC and the patient's admission status was agreed to be Admission Status: inpatient status to the service of Dr. Butler .               Assessment & Plan       Medical Decision Making  I have considered a broad diagnosis for abdominal pain that includes: Appendicitis, diverticulitis, colitis, cholecystitis, biliary colic, volvulus, small bowel obstruction, ileus, UTI, gastritis/PUD and other abdominal pathology.    Given the patient's physical exam and work-up today, there is low although not zero suspicion for these diagnoses.  Patient was advised and given appropriate return precautions, including continued or new fever, persistent vomiting, worsening abdominal pain, or any other concerning signs or symptoms especially if there are new.      Problems Addressed:  DKA (diabetic ketoacidosis) (HCC): acute illness or injury    Amount and/or Complexity of Data Reviewed  Labs: ordered. Decision-making details documented in ED Course.  Radiology: ordered.    Risk  OTC drugs.  Prescription drug management.  Decision regarding hospitalization.        ED Course as of 06/19/25 2345   Wed Jun 18, 2025   0537 WBC(!): 16.22   0537 Hemoglobin: 16.3   0556 GLUCOSE(!): 371   0556 ANION GAP(!): 20   0556 Carbon Dioxide(!): 13   0620 pH, Mamadou(!): 7.283   0626 Ketones, UA(!): 80 (3+)       Medications   sodium chloride 0.9 % bolus 1,000 mL (1,000 mL Intravenous New Bag 6/18/25 0523)   ondansetron (ZOFRAN) injection 4 mg (4 mg Intravenous Given 6/18/25 0522)   ketorolac  (TORADOL) injection 15 mg (15 mg Intravenous Given 6/18/25 0522)   fentaNYL injection 25 mcg (25 mcg Intravenous Given 6/18/25 0522)   fentaNYL injection 50 mcg (50 mcg Intravenous Given 6/18/25 0542)   sodium chloride 0.9 % bolus 1,000 mL (1,000 mL Intravenous New Bag 6/18/25 0706)   insulin regular (HumuLIN R,NovoLIN R) injection 6 Units (6 Units Intravenous Given 6/18/25 0624)   HYDROmorphone (DILAUDID) injection 0.5 mg (0.5 mg Intravenous Given 6/18/25 0716)       ED Risk Strat Scores                    No data recorded                            History of Present Illness       Chief Complaint   Patient presents with    Abdominal Pain     RLQ abdominal pain going into testicles, started around 0330 this morning, nausea; states he did 2 lines of cocaine last night- first time in a year       Past Medical History[1]   Past Surgical History[2]   Family History[3]   Social History[4]   E-Cigarette/Vaping    E-Cigarette Use Current Every Day User       E-Cigarette/Vaping Substances    Nicotine Yes     THC No     CBD No     Flavoring No     Other No     Unknown No       I have reviewed and agree with the history as documented.     Shun Linda is a 39 y.o.  year old male  Past Medical History:  No date: Anxiety  No date: Bipolar affective disorder (HCC)  No date: Hypertension  Social History    Tobacco Use      Smoking status: Former        Packs/day: 0.25        Types: Cigarettes    Vaping Use      Vaping status: Every Day        Substances: Nicotine    Alcohol use: Not Currently    Drug use: Yes      Types: Cocaine    Patient presents with:  Abdominal Pain: RLQ abdominal pain going into testicles, started around 0330 this morning, nausea; states he did 2 lines of cocaine last night- first time in a year  Definite nausea but no active vomiting.  Pain is sharp and severe constant.  Localizes to the right lower quadrant with radiation to testicles.  Does not have a history of similar problems in the past.    Does  have history of DVTs and hypertension but is noncompliant with medication.                    History provided by:  Patient   used: No    Abdominal Pain  Associated symptoms: nausea    Associated symptoms: no chest pain, no chills, no cough, no dysuria, no fever, no hematuria, no shortness of breath, no sore throat and no vomiting        Review of Systems   Constitutional:  Negative for chills and fever.   HENT:  Negative for ear pain and sore throat.    Eyes:  Negative for pain and visual disturbance.   Respiratory:  Negative for cough and shortness of breath.    Cardiovascular:  Negative for chest pain and palpitations.   Gastrointestinal:  Positive for abdominal pain and nausea. Negative for vomiting.   Genitourinary:  Positive for testicular pain. Negative for dysuria and hematuria.   Musculoskeletal:  Negative for arthralgias and back pain.   Skin:  Negative for color change and rash.   Neurological:  Negative for seizures and syncope.   All other systems reviewed and are negative.          Objective       ED Triage Vitals   Temperature Pulse Blood Pressure Respirations SpO2 Patient Position - Orthostatic VS   06/18/25 0506 06/18/25 0506 06/18/25 0506 06/18/25 0506 06/18/25 0506 06/18/25 1100   98.3 °F (36.8 °C) (!) 110 156/89 20 94 % Lying      Temp Source Heart Rate Source BP Location FiO2 (%) Pain Score    06/18/25 0903 06/19/25 0727 06/18/25 1100 -- 06/18/25 0506    Temporal Monitor Left arm  10 - Worst Possible Pain      Vitals      Date and Time Temp Pulse SpO2 Resp BP Pain Score FACES Pain Rating User   06/19/25 2120 -- -- -- -- -- 7 --    06/19/25 1532 97.9 °F (36.6 °C) 92 94 % 18 146/68 -- -- NA   06/19/25 1259 -- -- -- -- -- 9 -- HAK   06/19/25 1113 97.5 °F (36.4 °C) 98 96 % 20 140/74 -- -- NA   06/19/25 0830 -- -- -- -- -- 8 -- HAK   06/19/25 0829 -- -- -- -- -- 8 -- HAK   06/19/25 0727 97.2 °F (36.2 °C) 78 95 % 19 137/75 -- -- NA   06/19/25 0617 -- -- -- -- -- 8 -- TS    06/19/25 0430 97.5 °F (36.4 °C) 87 94 % 27 141/63 -- -- MM   06/19/25 0407 -- -- -- -- -- 7 -- TS   06/19/25 0240 -- -- -- -- -- 8 -- TS   06/19/25 0000 96.8 °F (36 °C) -- -- -- -- -- -- [facility]   06/18/25 2204 -- -- -- -- -- 8 -- TS   06/18/25 2103 -- -- -- -- -- 8 -- TS   06/18/25 2000 -- -- -- -- -- 8 -- TS   06/18/25 1921 98 °F (36.7 °C) -- -- -- 126/90 -- -- KG   06/18/25 1900 -- 82 96 % 23 -- -- -- [facility]   06/18/25 1814 -- 76 97 % 34 -- -- -- [facility]   06/18/25 1814 -- -- -- -- -- 9 -- BR   06/18/25 1800 -- 80 95 % 22 -- -- -- [facility]   06/18/25 1700 -- 76 92 % 18 -- -- -- [facility]   06/18/25 1600 -- 79 94 % 21 -- -- -- [facility]   06/18/25 1500 98.5 °F (36.9 °C) -- -- -- 125/71 -- -- MB   06/18/25 1500 -- 87 95 % 23 -- -- -- [facility]   06/18/25 1413 -- -- -- -- -- 10 - Worst Possible Pain -- [facility]   06/18/25 1304 -- -- -- -- -- 9 -- [facility]   06/18/25 1115 -- -- -- -- -- 8 -- DL   06/18/25 1100 98.6 °F (37 °C) -- -- -- 137/83 -- -- MB   06/18/25 1000 -- 108 97 % 24 -- -- -- [facility]   06/18/25 0903 98.1 °F (36.7 °C) -- -- -- -- -- -- MB   06/18/25 0903 -- 116 -- 29 -- -- -- [facility]   06/18/25 0825 -- 98 96 % 29 136/88 -- -- [facility]   06/18/25 0820 -- -- -- -- -- 10 - Worst Possible Pain -- [facility]   06/18/25 0716 -- -- -- -- -- 10 - Worst Possible Pain -- AM   06/18/25 0715 -- 105 92 % 16 156/97 -- -- AM   06/18/25 0657 -- 102 -- -- -- -- -- AM   06/18/25 0542 -- -- -- -- -- 10 - Worst Possible Pain --    06/18/25 0506 98.3 °F (36.8 °C) 110 94 % 20 156/89 10 - Worst Possible Pain -- AF            Physical Exam  Vitals and nursing note reviewed.   Constitutional:       General: He is in acute distress (due to pain / clammy).      Appearance: He is well-developed. He is obese.   HENT:      Head: Normocephalic and atraumatic.     Eyes:      Extraocular Movements: Extraocular movements intact.      Conjunctiva/sclera: Conjunctivae normal.      Pupils: Pupils are equal, round, and reactive to light.       Cardiovascular:      Rate and Rhythm: Normal rate and  regular rhythm.      Heart sounds: No murmur heard.  Pulmonary:      Effort: Pulmonary effort is normal. No respiratory distress.      Breath sounds: Normal breath sounds.   Abdominal:      General: Bowel sounds are normal.      Palpations: Abdomen is soft.      Tenderness: There is abdominal tenderness in the right lower quadrant. There is no guarding or rebound.      Hernia: No hernia is present.     Musculoskeletal:         General: No swelling.      Cervical back: Neck supple.     Skin:     General: Skin is warm and dry.      Capillary Refill: Capillary refill takes less than 2 seconds.     Neurological:      General: No focal deficit present.      Mental Status: He is alert and oriented to person, place, and time.     Psychiatric:         Mood and Affect: Mood normal.         Behavior: Behavior normal.         Results Reviewed       Procedure Component Value Units Date/Time    Basic metabolic panel [[id number]]  (Abnormal) Collected: 06/18/25 1623    Lab Status: Final result Specimen: Blood from Arm, Left Updated: 06/18/25 1650     Sodium 131 mmol/L      Potassium 3.8 mmol/L      Chloride 102 mmol/L      CO2 18 mmol/L      ANION GAP 11 mmol/L      BUN 11 mg/dL      Creatinine 0.98 mg/dL      Glucose 243 mg/dL      Calcium 8.3 mg/dL      eGFR 95 ml/min/1.73sq m     Narrative:      National Kid[employer] Foundation guidelines for Chronic Kidney Disease (CKD):     Stage 1 with normal or high GFR (GFR > 90 mL/min/1.73 square meters)    Stage 2 Mild CKD (GFR = 60-89 mL/min/1.73 square meters)    Stage 3A Moderate CKD (GFR = 45-59 mL/min/1.73 square meters)    Stage 3B Moderate CKD (GFR = 30-44 mL/min/1.73 square meters)    Stage 4 Severe CKD (GFR = 15-29 mL/min/1.73 square meters)    Stage 5 End Stage CKD (GFR <15 mL/min/1.73 square meters)  Note: GFR calculation is accurate only with a steady state creatinine    Magnesium [[id number]]  (Abnormal) Collected: 06/18/25 1623    Lab Status: Final result Specimen: Blood  from Arm, Left Updated: 06/18/25 1650     Magnesium 1.8 mg/dL     Phosphorus [[id number]]  (Normal) Collected: 06/18/25 1623    Lab Status: Final result Specimen: Blood from Arm, Left Updated: 06/18/25 1650     Phosphorus 3.3 mg/dL     Basic metabolic panel [[id number]]  (Abnormal) Collected: 06/18/25 1237    Lab Status: Final result Specimen: Blood from Arm, Left Updated: 06/18/25 1308     Sodium 133 mmol/L      Potassium 3.6 mmol/L      Chloride 104 mmol/L      CO2 17 mmol/L      ANION GAP 12 mmol/L      BUN 12 mg/dL      Creatinine 1.02 mg/dL      Glucose 81 mg/dL      Calcium 8.9 mg/dL      eGFR 90 ml/min/1.73sq m     Narrative:      National Kid[employer] Disease Foundation guidelines for Chronic Kidney Disease (CKD):     Stage 1 with normal or high GFR (GFR > 90 mL/min/1.73 square meters)    Stage 2 Mild CKD (GFR = 60-89 mL/min/1.73 square meters)    Stage 3A Moderate CKD (GFR = 45-59 mL/min/1.73 square meters)    Stage 3B Moderate CKD (GFR = 30-44 mL/min/1.73 square meters)    Stage 4 Severe CKD (GFR = 15-29 mL/min/1.73 square meters)    Stage 5 End Stage CKD (GFR <15 mL/min/1.73 square meters)  Note: GFR calculation is accurate only with a steady state creatinine    Magnesium [[id number]]  (Normal) Collected: 06/18/25 1237    Lab Status: Final result Specimen: Blood from Arm, Left Updated: 06/18/25 1308     Magnesium 1.9 mg/dL     Phosphorus [[id number]]  (Abnormal) Collected: 06/18/25 1237    Lab Status: Final result Specimen: Blood from Arm, Left Updated: 06/18/25 1308     Phosphorus 2.3 mg/dL     Hemoglobin A1C w/ EAG Estimation [[id number]]  (Abnormal) Collected: 06/18/25 0523    Lab Status: Final result Specimen: Blood from Hand, Right Updated: 06/18/25 1249     Hemoglobin A1C 11.6 %       mg/dl     Basic metabolic panel [[id number]]  (Abnormal) Collected: 06/18/25 0920    Lab Status: Final result Specimen: Blood from Arm, Left Updated: 06/18/25 0941     Sodium 133 mmol/L      Potassium 4.1 mmol/L       Chloride 104 mmol/L      CO2 14 mmol/L      ANION GAP 15 mmol/L      BUN 12 mg/dL      Creatinine 1.06 mg/dL      Glucose 190 mg/dL      Calcium 8.9 mg/dL      eGFR 86 ml/min/1.73sq m     Narrative:      National Kid[employer] Disease Foundation guidelines for Chronic Kidney Disease (CKD):     Stage 1 with normal or high GFR (GFR > 90 mL/min/1.73 square meters)    Stage 2 Mild CKD (GFR = 60-89 mL/min/1.73 square meters)    Stage 3A Moderate CKD (GFR = 45-59 mL/min/1.73 square meters)    Stage 3B Moderate CKD (GFR = 30-44 mL/min/1.73 square meters)    Stage 4 Severe CKD (GFR = 15-29 mL/min/1.73 square meters)    Stage 5 End Stage CKD (GFR <15 mL/min/1.73 square meters)  Note: GFR calculation is accurate only with a steady state creatinine    Magnesium [[id number]]  (Normal) Collected: 06/18/25 0920    Lab Status: Final result Specimen: Blood from Arm, Left Updated: 06/18/25 0941     Magnesium 2.0 mg/dL     Phosphorus [[id number]]  (Abnormal) Collected: 06/18/25 0920    Lab Status: Final result Specimen: Blood from Arm, Left Updated: 06/18/25 0941     Phosphorus 2.6 mg/dL     Lipid panel [[id number]]  (Abnormal) Collected: 06/18/25 0523    Lab Status: Final result Specimen: Blood from Hand, Right Updated: 06/18/25 0938     Cholesterol 277 mg/dL      Triglycerides 1,249 mg/dL      HDL, Direct 23 mg/dL      LDL Calculated --     Non-HDL-Chol (CHOL-HDL) 254 mg/dl     Procalcitonin [[id number]]  (Normal) Collected: 06/18/25 0523    Lab Status: Final result Specimen: Blood from Hand, Right Updated: 06/18/25 0844     Procalcitonin 0.11 ng/ml     Urine Microscopic [[id number]]  (Normal) Collected: 06/18/25 0554    Lab Status: Final result Specimen: Urine, Clean Catch Updated: 06/18/25 0729     RBC, UA 0-1 /hpf      WBC, UA None Seen /hpf      Epithelial Cells None Seen /hpf      Bacteria, UA None Seen /hpf     Fingerstick Glucose (POCT) [[id number]]  (Abnormal) Collected: 06/18/25 0782    Lab Status: Final result Specimen: Blood Updated:  06/18/25 0724     POC Glucose 370 mg/dl     Beta Hydroxybutyrate [[id number]]  (Abnormal) Collected: 06/18/25 0523    Lab Status: Final result Specimen: Blood from Hand, Right Updated: 06/18/25 0641     Beta- Hydroxybutyrate 4.71 mmol/L     UA w Reflex to Microscopic w Reflex to Culture [[id number]]  (Abnormal) Collected: 06/18/25 0554    Lab Status: Final result Specimen: Urine, Clean Catch Updated: 06/18/25 0632     Color, UA Yellow     Clarity, UA Clear     Specific Gravity, UA >=1.030     pH, UA 6.0     Leukocytes, UA Negative     Nitrite, UA Negative     Protein, UA 2+ mg/dl      Glucose, UA 3+ mg/dl      Ketones, UA 80 (3+) mg/dl      Urobilinogen, UA 0.2 E.U./dl      Bilirubin, UA 1+     Occult Blood, UA 2+    Lactic acid, plasma (w/reflex if result > 2.0) [[id number]]  (Normal) Collected: 06/18/25 0601    Lab Status: Final result Specimen: Blood from Hand, Right Updated: 06/18/25 0629     LACTIC ACID 1.9 mmol/L     Narrative:      Result may be elevated if tourniquet was used during collection.    Blood gas, venous [[id number]]  (Abnormal) Collected: 06/18/25 0601    Lab Status: Final result Specimen: Blood from Arm, Right Updated: 06/18/25 0612     pH, Mamadou 7.283     pCO2, Mamadou 28.8 mm Hg      pO2, Mamadou 107.9 mm Hg      HCO3, Mamadou 13.3 mmol/L      Base Excess, Mamadou -11.6 mmol/L      O2 Content, Mamadou 22.7 ml/dL      O2 HGB, VENOUS 95.8 %     Basic metabolic panel [[id number]]  (Abnormal) Collected: 06/18/25 0523    Lab Status: Final result Specimen: Blood from Hand, Right Updated: 06/18/25 0546     Sodium 132 mmol/L      Potassium 4.3 mmol/L      Chloride 99 mmol/L      CO2 13 mmol/L      ANION GAP 20 mmol/L      BUN 12 mg/dL      Creatinine 1.18 mg/dL      Glucose 371 mg/dL      Calcium 9.3 mg/dL      eGFR 76 ml/min/1.73sq m     Narrative:      National Kidney Disease Foundation guidelines for Chronic Kidney Disease (CKD):     Stage 1 with normal or high GFR (GFR > 90 mL/min/1.73 square meters)    Stage 2 Mild CKD  (GFR = 60-89 mL/min/1.73 square meters)    Stage 3A Moderate CKD (GFR = 45-59 mL/min/1.73 square meters)    Stage 3B Moderate CKD (GFR = 30-44 mL/min/1.73 square meters)    Stage 4 Severe CKD (GFR = 15-29 mL/min/1.73 square meters)    Stage 5 End Stage CKD (GFR <15 mL/min/1.73 square meters)  Note: GFR calculation is accurate only with a steady state creatinine    CBC and differential [[id number]]  (Abnormal) Collected: 06/18/25 0523    Lab Status: Final result Specimen: Blood from Hand, Right Updated: 06/18/25 0530     WBC 16.22 Thousand/uL      RBC 5.18 Million/uL      Hemoglobin 16.3 g/dL      Hematocrit 47.0 %      MCV 91 fL      MCH 31.5 pg      MCHC 34.7 g/dL      RDW 11.5 %      MPV 11.1 fL      Platelets 360 Thousands/uL      nRBC 0 /100 WBCs      Segmented % 78 %      Immature Grans % 1 %      Lymphocytes % 13 %      Monocytes % 7 %      Eosinophils Relative 0 %      Basophils Relative 1 %      Absolute Neutrophils 12.60 Thousands/µL      Absolute Immature Grans 0.19 Thousand/uL      Absolute Lymphocytes 2.13 Thousands/µL      Absolute Monocytes 1.15 Thousand/µL      Eosinophils Absolute 0.01 Thousand/µL      Basophils Absolute 0.14 Thousands/µL             CT renal stone study abdomen pelvis wo contrast   Final Interpretation by Dandre Poon MD (06/19 6064)      No urinary tract calculi.      Stable hepatomegaly with steatosis.         Workstation performed: [id number]         US scrotum and testicles   Final Interpretation by Delta Joseph MD (06/18 0630)      Normal exam.      Workstation performed: [id number]         CT abdomen pelvis wo contrast   Final Interpretation by Jake Abreu MD (06/18 0528)      No acute findings in the abdomen or pelvis within the limits of unenhanced technique.      Hepatomegaly with steatosis.      Workstation performed: [id number]             Procedures    ED Medication and Procedure Management   Prior to Admission Medications   Prescriptions Last  Dose Informant Patient Reported? Taking?   ALPRAZolam (XANAX) 2 MG tablet   Yes No   Sig: Take 2 mg by mouth 3 (three) times a day as needed for anxiety.   acetaminophen (TYLENOL) 325 mg tablet Past Week  No Yes   Sig: Take 2 tablets (650 mg total) by mouth every 6 (six) hours as needed for mild pain   asenapine (SAPHRIS) 10 mg SL tablet   Yes No   Sig: Place 5 mg under the tongue daily at bedtime as needed.   dextromethorphan-guaiFENesin (ROBITUSSIN DM)  mg/5 mL syrup   No No   Sig: Take 5 mL by mouth 3 (three) times a day as needed for congestion or cough   divalproex sodium (DEPAKOTE ER) 500 mg 24 hr tablet   Yes No   Sig: Take 1,500 mg by mouth daily at bedtime.   lisinopril (ZESTRIL) 5 mg tablet   Yes No   Sig: Take 5 mg by mouth daily.      Facility-Administered Medications: None     Current Discharge Medication List        CONTINUE these medications which have NOT CHANGED    Details   acetaminophen (TYLENOL) 325 mg tablet Take 2 tablets (650 mg total) by mouth every 6 (six) hours as needed for mild pain  Qty: 30 tablet, Refills: 0    Associated Diagnoses: Upper respiratory infection      ALPRAZolam (XANAX) 2 MG tablet Take 2 mg by mouth 3 (three) times a day as needed for anxiety.      asenapine (SAPHRIS) 10 mg SL tablet Place 5 mg under the tongue daily at bedtime as needed.      dextromethorphan-guaiFENesin (ROBITUSSIN DM)  mg/5 mL syrup Take 5 mL by mouth 3 (three) times a day as needed for congestion or cough  Qty: 118 mL, Refills: 0    Associated Diagnoses: Upper respiratory infection      divalproex sodium (DEPAKOTE ER) 500 mg 24 hr tablet Take 1,500 mg by mouth daily at bedtime.      lisinopril (ZESTRIL) 5 mg tablet Take 5 mg by mouth daily.           No discharge procedures on file.  ED SEPSIS DOCUMENTATION   Time reflects when diagnosis was documented in both MDM as applicable and the Disposition within this note       Time User Action Codes Description Comment    6/18/2025  6:55 AM  Devante Almazan Add [E11.10] DKA (diabetic ketoacidosis) ([facility])     6/19/2025  7:39 AM Tori Dorsey Add [E78.1] Hypertriglyceridemia                    [1]   Past Medical History:  Diagnosis Date    Anxiety     Bipolar affective disorder (HCC)     Hypertension    [2]   Past Surgical History:  Procedure Laterality Date    HERNIA REPAIR      MANDIBLE FRACTURE SURGERY      ORTHOPEDIC SURGERY     [3] No family history on file.  [4]   Social History  Tobacco Use    Smoking status: Former     Current packs/day: 0.25     Types: Cigarettes   Vaping Use    Vaping status: Every Day    Substances: Nicotine   Substance Use Topics    Alcohol use: Not Currently    Drug use: Yes     Types: Cocaine        Devante Almazan MD  06/19/25 0502

## 2025-06-19 ENCOUNTER — APPOINTMENT (INPATIENT)
Dept: CT IMAGING | Facility: HOSPITAL | Age: 40
End: 2025-06-19
Payer: COMMERCIAL

## 2025-06-19 PROBLEM — R65.10 SIRS (SYSTEMIC INFLAMMATORY RESPONSE SYNDROME) (HCC): Status: ACTIVE | Noted: 2025-06-18

## 2025-06-19 LAB
ALBUMIN SERPL BCG-MCNC: 3.7 G/DL (ref 3.5–5)
ALBUMIN SERPL BCG-MCNC: 3.7 G/DL (ref 3.5–5)
ALP SERPL-CCNC: 102 U/L (ref 34–104)
ALP SERPL-CCNC: 104 U/L (ref 34–104)
ALT SERPL W P-5'-P-CCNC: 48 U/L (ref 7–52)
ALT SERPL W P-5'-P-CCNC: 49 U/L (ref 7–52)
ANION GAP SERPL CALCULATED.3IONS-SCNC: 7 MMOL/L (ref 4–13)
ANION GAP SERPL CALCULATED.3IONS-SCNC: 7 MMOL/L (ref 4–13)
AST SERPL W P-5'-P-CCNC: 62 U/L (ref 13–39)
AST SERPL W P-5'-P-CCNC: 63 U/L (ref 13–39)
BASOPHILS # BLD AUTO: 0.08 THOUSANDS/ÂΜL (ref 0–0.1)
BASOPHILS # BLD AUTO: 0.09 THOUSANDS/ÂΜL (ref 0–0.1)
BASOPHILS NFR BLD AUTO: 1 % (ref 0–1)
BASOPHILS NFR BLD AUTO: 1 % (ref 0–1)
BILIRUB SERPL-MCNC: 0.39 MG/DL (ref 0.2–1)
BILIRUB SERPL-MCNC: 0.4 MG/DL (ref 0.2–1)
BUN SERPL-MCNC: 14 MG/DL (ref 5–25)
BUN SERPL-MCNC: 14 MG/DL (ref 5–25)
CA-I BLD-SCNC: 0.99 MMOL/L (ref 1.12–1.32)
CALCIUM SERPL-MCNC: 8.3 MG/DL (ref 8.4–10.2)
CALCIUM SERPL-MCNC: 8.7 MG/DL (ref 8.4–10.2)
CHLORIDE SERPL-SCNC: 104 MMOL/L (ref 96–108)
CHLORIDE SERPL-SCNC: 106 MMOL/L (ref 96–108)
CO2 SERPL-SCNC: 22 MMOL/L (ref 21–32)
CO2 SERPL-SCNC: 22 MMOL/L (ref 21–32)
CREAT SERPL-MCNC: 0.91 MG/DL (ref 0.6–1.3)
CREAT SERPL-MCNC: 0.95 MG/DL (ref 0.6–1.3)
EOSINOPHIL # BLD AUTO: 0.2 THOUSAND/ÂΜL (ref 0–0.61)
EOSINOPHIL # BLD AUTO: 0.22 THOUSAND/ÂΜL (ref 0–0.61)
EOSINOPHIL NFR BLD AUTO: 2 % (ref 0–6)
EOSINOPHIL NFR BLD AUTO: 2 % (ref 0–6)
ERYTHROCYTE [DISTWIDTH] IN BLOOD BY AUTOMATED COUNT: 11.7 % (ref 11.6–15.1)
ERYTHROCYTE [DISTWIDTH] IN BLOOD BY AUTOMATED COUNT: 11.7 % (ref 11.6–15.1)
GFR SERPL CREATININE-BSD FRML MDRD: 104 ML/MIN/1.73SQ M
GFR SERPL CREATININE-BSD FRML MDRD: 99 ML/MIN/1.73SQ M
GLUCOSE SERPL-MCNC: 162 MG/DL (ref 65–140)
GLUCOSE SERPL-MCNC: 170 MG/DL (ref 65–140)
GLUCOSE SERPL-MCNC: 186 MG/DL (ref 65–140)
GLUCOSE SERPL-MCNC: 218 MG/DL (ref 65–140)
GLUCOSE SERPL-MCNC: 222 MG/DL (ref 65–140)
GLUCOSE SERPL-MCNC: 226 MG/DL (ref 65–140)
GLUCOSE SERPL-MCNC: 240 MG/DL (ref 65–140)
GLUCOSE SERPL-MCNC: 250 MG/DL (ref 65–140)
GLUCOSE SERPL-MCNC: 67 MG/DL (ref 65–140)
GLUCOSE SERPL-MCNC: 91 MG/DL (ref 65–140)
HCT VFR BLD AUTO: 36.5 % (ref 36.5–49.3)
HCT VFR BLD AUTO: 37.5 % (ref 36.5–49.3)
HGB BLD-MCNC: 12.5 G/DL (ref 12–17)
HGB BLD-MCNC: 12.6 G/DL (ref 12–17)
IMM GRANULOCYTES # BLD AUTO: 0.06 THOUSAND/UL (ref 0–0.2)
IMM GRANULOCYTES # BLD AUTO: 0.06 THOUSAND/UL (ref 0–0.2)
IMM GRANULOCYTES NFR BLD AUTO: 1 % (ref 0–2)
IMM GRANULOCYTES NFR BLD AUTO: 1 % (ref 0–2)
LYMPHOCYTES # BLD AUTO: 2.73 THOUSANDS/ÂΜL (ref 0.6–4.47)
LYMPHOCYTES # BLD AUTO: 2.81 THOUSANDS/ÂΜL (ref 0.6–4.47)
LYMPHOCYTES NFR BLD AUTO: 27 % (ref 14–44)
LYMPHOCYTES NFR BLD AUTO: 27 % (ref 14–44)
MAGNESIUM SERPL-MCNC: 2.1 MG/DL (ref 1.9–2.7)
MAGNESIUM SERPL-MCNC: 2.1 MG/DL (ref 1.9–2.7)
MCH RBC QN AUTO: 30.9 PG (ref 26.8–34.3)
MCH RBC QN AUTO: 31.4 PG (ref 26.8–34.3)
MCHC RBC AUTO-ENTMCNC: 33.3 G/DL (ref 31.4–37.4)
MCHC RBC AUTO-ENTMCNC: 34.5 G/DL (ref 31.4–37.4)
MCV RBC AUTO: 91 FL (ref 82–98)
MCV RBC AUTO: 93 FL (ref 82–98)
MONOCYTES # BLD AUTO: 0.69 THOUSAND/ÂΜL (ref 0.17–1.22)
MONOCYTES # BLD AUTO: 0.89 THOUSAND/ÂΜL (ref 0.17–1.22)
MONOCYTES NFR BLD AUTO: 7 % (ref 4–12)
MONOCYTES NFR BLD AUTO: 9 % (ref 4–12)
NEUTROPHILS # BLD AUTO: 6.39 THOUSANDS/ÂΜL (ref 1.85–7.62)
NEUTROPHILS # BLD AUTO: 6.41 THOUSANDS/ÂΜL (ref 1.85–7.62)
NEUTS SEG NFR BLD AUTO: 60 % (ref 43–75)
NEUTS SEG NFR BLD AUTO: 62 % (ref 43–75)
NRBC BLD AUTO-RTO: 0 /100 WBCS
NRBC BLD AUTO-RTO: 0 /100 WBCS
PHOSPHATE SERPL-MCNC: 2.8 MG/DL (ref 2.7–4.5)
PHOSPHATE SERPL-MCNC: 3.1 MG/DL (ref 2.7–4.5)
PLATELET # BLD AUTO: 219 THOUSANDS/UL (ref 149–390)
PLATELET # BLD AUTO: 220 THOUSANDS/UL (ref 149–390)
PMV BLD AUTO: 10.2 FL (ref 8.9–12.7)
PMV BLD AUTO: 10.8 FL (ref 8.9–12.7)
POTASSIUM SERPL-SCNC: 3.4 MMOL/L (ref 3.5–5.3)
POTASSIUM SERPL-SCNC: 3.7 MMOL/L (ref 3.5–5.3)
PROT SERPL-MCNC: 5.9 G/DL (ref 6.4–8.4)
PROT SERPL-MCNC: 6 G/DL (ref 6.4–8.4)
RBC # BLD AUTO: 4.01 MILLION/UL (ref 3.88–5.62)
RBC # BLD AUTO: 4.05 MILLION/UL (ref 3.88–5.62)
SODIUM SERPL-SCNC: 133 MMOL/L (ref 135–147)
SODIUM SERPL-SCNC: 135 MMOL/L (ref 135–147)
WBC # BLD AUTO: 10.17 THOUSAND/UL (ref 4.31–10.16)
WBC # BLD AUTO: 10.46 THOUSAND/UL (ref 4.31–10.16)

## 2025-06-19 PROCEDURE — 83735 ASSAY OF MAGNESIUM: CPT | Performed by: NURSE PRACTITIONER

## 2025-06-19 PROCEDURE — 80053 COMPREHEN METABOLIC PANEL: CPT | Performed by: NURSE PRACTITIONER

## 2025-06-19 PROCEDURE — 82330 ASSAY OF CALCIUM: CPT | Performed by: NURSE PRACTITIONER

## 2025-06-19 PROCEDURE — 74176 CT ABD & PELVIS W/O CONTRAST: CPT

## 2025-06-19 PROCEDURE — NC001 PR NO CHARGE: Performed by: STUDENT IN AN ORGANIZED HEALTH CARE EDUCATION/TRAINING PROGRAM

## 2025-06-19 PROCEDURE — 99232 SBSQ HOSP IP/OBS MODERATE 35: CPT | Performed by: STUDENT IN AN ORGANIZED HEALTH CARE EDUCATION/TRAINING PROGRAM

## 2025-06-19 PROCEDURE — 99254 IP/OBS CNSLTJ NEW/EST MOD 60: CPT | Performed by: STUDENT IN AN ORGANIZED HEALTH CARE EDUCATION/TRAINING PROGRAM

## 2025-06-19 PROCEDURE — 82948 REAGENT STRIP/BLOOD GLUCOSE: CPT

## 2025-06-19 PROCEDURE — 83519 RIA NONANTIBODY: CPT | Performed by: NURSE PRACTITIONER

## 2025-06-19 PROCEDURE — 84100 ASSAY OF PHOSPHORUS: CPT | Performed by: NURSE PRACTITIONER

## 2025-06-19 PROCEDURE — 86341 ISLET CELL ANTIBODY: CPT | Performed by: NURSE PRACTITIONER

## 2025-06-19 PROCEDURE — 85025 COMPLETE CBC W/AUTO DIFF WBC: CPT | Performed by: NURSE PRACTITIONER

## 2025-06-19 RX ORDER — INSULIN LISPRO 100 [IU]/ML
2-12 INJECTION, SOLUTION INTRAVENOUS; SUBCUTANEOUS
Status: DISCONTINUED | OUTPATIENT
Start: 2025-06-19 | End: 2025-06-19

## 2025-06-19 RX ORDER — KETOROLAC TROMETHAMINE 30 MG/ML
30 INJECTION, SOLUTION INTRAMUSCULAR; INTRAVENOUS ONCE
Status: COMPLETED | OUTPATIENT
Start: 2025-06-19 | End: 2025-06-19

## 2025-06-19 RX ORDER — POTASSIUM CHLORIDE 1500 MG/1
40 TABLET, EXTENDED RELEASE ORAL ONCE
Status: COMPLETED | OUTPATIENT
Start: 2025-06-19 | End: 2025-06-19

## 2025-06-19 RX ORDER — INSULIN LISPRO 100 [IU]/ML
2-12 INJECTION, SOLUTION INTRAVENOUS; SUBCUTANEOUS
Status: DISCONTINUED | OUTPATIENT
Start: 2025-06-20 | End: 2025-06-20 | Stop reason: HOSPADM

## 2025-06-19 RX ORDER — FENOFIBRATE 145 MG/1
145 TABLET, FILM COATED ORAL DAILY
Status: DISCONTINUED | OUTPATIENT
Start: 2025-06-19 | End: 2025-06-20 | Stop reason: HOSPADM

## 2025-06-19 RX ORDER — CALCIUM CARBONATE 500 MG/1
500 TABLET, CHEWABLE ORAL DAILY PRN
Status: DISCONTINUED | OUTPATIENT
Start: 2025-06-19 | End: 2025-06-20 | Stop reason: HOSPADM

## 2025-06-19 RX ORDER — ACETAMINOPHEN 325 MG/1
650 TABLET ORAL EVERY 6 HOURS PRN
Status: DISCONTINUED | OUTPATIENT
Start: 2025-06-19 | End: 2025-06-20 | Stop reason: HOSPADM

## 2025-06-19 RX ORDER — ENOXAPARIN SODIUM 100 MG/ML
40 INJECTION SUBCUTANEOUS
Status: DISCONTINUED | OUTPATIENT
Start: 2025-06-19 | End: 2025-06-19

## 2025-06-19 RX ORDER — ATORVASTATIN CALCIUM 40 MG/1
40 TABLET, FILM COATED ORAL
Status: DISCONTINUED | OUTPATIENT
Start: 2025-06-19 | End: 2025-06-20 | Stop reason: HOSPADM

## 2025-06-19 RX ORDER — INSULIN LISPRO 100 [IU]/ML
8 INJECTION, SOLUTION INTRAVENOUS; SUBCUTANEOUS
Status: DISCONTINUED | OUTPATIENT
Start: 2025-06-19 | End: 2025-06-19

## 2025-06-19 RX ORDER — INSULIN GLARGINE 100 [IU]/ML
30 INJECTION, SOLUTION SUBCUTANEOUS
Status: DISCONTINUED | OUTPATIENT
Start: 2025-06-20 | End: 2025-06-19

## 2025-06-19 RX ORDER — PANTOPRAZOLE SODIUM 40 MG/1
40 TABLET, DELAYED RELEASE ORAL
Status: DISCONTINUED | OUTPATIENT
Start: 2025-06-19 | End: 2025-06-20 | Stop reason: HOSPADM

## 2025-06-19 RX ORDER — INSULIN LISPRO 100 [IU]/ML
10 INJECTION, SOLUTION INTRAVENOUS; SUBCUTANEOUS
Status: DISCONTINUED | OUTPATIENT
Start: 2025-06-20 | End: 2025-06-20

## 2025-06-19 RX ORDER — INSULIN GLARGINE 100 [IU]/ML
30 INJECTION, SOLUTION SUBCUTANEOUS ONCE
Status: COMPLETED | OUTPATIENT
Start: 2025-06-19 | End: 2025-06-19

## 2025-06-19 RX ORDER — INSULIN GLARGINE 100 [IU]/ML
30 INJECTION, SOLUTION SUBCUTANEOUS EVERY MORNING
Status: DISCONTINUED | OUTPATIENT
Start: 2025-06-20 | End: 2025-06-19

## 2025-06-19 RX ORDER — INSULIN GLARGINE 100 [IU]/ML
36 INJECTION, SOLUTION SUBCUTANEOUS EVERY MORNING
Status: DISCONTINUED | OUTPATIENT
Start: 2025-06-20 | End: 2025-06-20 | Stop reason: HOSPADM

## 2025-06-19 RX ORDER — CALCIUM GLUCONATE 20 MG/ML
2 INJECTION, SOLUTION INTRAVENOUS ONCE
Status: COMPLETED | OUTPATIENT
Start: 2025-06-19 | End: 2025-06-19

## 2025-06-19 RX ORDER — INSULIN LISPRO 100 [IU]/ML
1-6 INJECTION, SOLUTION INTRAVENOUS; SUBCUTANEOUS
Status: DISCONTINUED | OUTPATIENT
Start: 2025-06-19 | End: 2025-06-20 | Stop reason: HOSPADM

## 2025-06-19 RX ORDER — CYCLOBENZAPRINE HCL 10 MG
10 TABLET ORAL 3 TIMES DAILY PRN
Status: DISCONTINUED | OUTPATIENT
Start: 2025-06-19 | End: 2025-06-20 | Stop reason: HOSPADM

## 2025-06-19 RX ADMIN — HYDROMORPHONE HYDROCHLORIDE 1 MG: 1 INJECTION, SOLUTION INTRAMUSCULAR; INTRAVENOUS; SUBCUTANEOUS at 06:17

## 2025-06-19 RX ADMIN — POTASSIUM CHLORIDE 40 MEQ: 1500 TABLET, EXTENDED RELEASE ORAL at 12:59

## 2025-06-19 RX ADMIN — OXYCODONE HYDROCHLORIDE 5 MG: 5 TABLET ORAL at 08:30

## 2025-06-19 RX ADMIN — INSULIN LISPRO 8 UNITS: 100 INJECTION, SOLUTION INTRAVENOUS; SUBCUTANEOUS at 17:08

## 2025-06-19 RX ADMIN — CHLORHEXIDINE GLUCONATE 15 ML: 1.2 SOLUTION ORAL at 08:21

## 2025-06-19 RX ADMIN — INSULIN LISPRO 8 UNITS: 100 INJECTION, SOLUTION INTRAVENOUS; SUBCUTANEOUS at 13:00

## 2025-06-19 RX ADMIN — INSULIN LISPRO 8 UNITS: 100 INJECTION, SOLUTION INTRAVENOUS; SUBCUTANEOUS at 09:15

## 2025-06-19 RX ADMIN — INSULIN LISPRO 2 UNITS: 100 INJECTION, SOLUTION INTRAVENOUS; SUBCUTANEOUS at 21:16

## 2025-06-19 RX ADMIN — KETOROLAC TROMETHAMINE 30 MG: 30 INJECTION, SOLUTION INTRAMUSCULAR; INTRAVENOUS at 04:07

## 2025-06-19 RX ADMIN — ATORVASTATIN CALCIUM 40 MG: 40 TABLET, FILM COATED ORAL at 16:14

## 2025-06-19 RX ADMIN — PANTOPRAZOLE SODIUM 40 MG: 40 TABLET, DELAYED RELEASE ORAL at 11:12

## 2025-06-19 RX ADMIN — FENOFIBRATE 145 MG: 145 TABLET, FILM COATED ORAL at 08:21

## 2025-06-19 RX ADMIN — APIXABAN 5 MG: 5 TABLET, FILM COATED ORAL at 17:07

## 2025-06-19 RX ADMIN — HYDROMORPHONE HYDROCHLORIDE 1 MG: 1 INJECTION, SOLUTION INTRAMUSCULAR; INTRAVENOUS; SUBCUTANEOUS at 02:40

## 2025-06-19 RX ADMIN — SODIUM CHLORIDE 14 UNITS/HR: 9 INJECTION, SOLUTION INTRAVENOUS at 02:05

## 2025-06-19 RX ADMIN — ACETAMINOPHEN 650 MG: 325 TABLET ORAL at 08:29

## 2025-06-19 RX ADMIN — CYCLOBENZAPRINE HYDROCHLORIDE 10 MG: 10 TABLET, FILM COATED ORAL at 16:14

## 2025-06-19 RX ADMIN — ACETAMINOPHEN 1000 MG: 10 INJECTION INTRAVENOUS at 01:38

## 2025-06-19 RX ADMIN — CALCIUM GLUCONATE 2 G: 20 INJECTION, SOLUTION INTRAVENOUS at 06:04

## 2025-06-19 RX ADMIN — INSULIN LISPRO 4 UNITS: 100 INJECTION, SOLUTION INTRAVENOUS; SUBCUTANEOUS at 17:08

## 2025-06-19 RX ADMIN — CYCLOBENZAPRINE HYDROCHLORIDE 10 MG: 10 TABLET, FILM COATED ORAL at 22:38

## 2025-06-19 RX ADMIN — APIXABAN 5 MG: 5 TABLET, FILM COATED ORAL at 08:21

## 2025-06-19 RX ADMIN — INSULIN GLARGINE 30 UNITS: 100 INJECTION, SOLUTION SUBCUTANEOUS at 08:21

## 2025-06-19 RX ADMIN — ACETAMINOPHEN 650 MG: 325 TABLET ORAL at 21:20

## 2025-06-19 RX ADMIN — ANTACID TABLETS 500 MG: 500 TABLET, CHEWABLE ORAL at 11:12

## 2025-06-19 RX ADMIN — INSULIN LISPRO 2 UNITS: 100 INJECTION, SOLUTION INTRAVENOUS; SUBCUTANEOUS at 11:16

## 2025-06-19 RX ADMIN — KETOROLAC TROMETHAMINE 30 MG: 30 INJECTION, SOLUTION INTRAMUSCULAR; INTRAVENOUS at 12:59

## 2025-06-19 NOTE — PLAN OF CARE
Problem: Potential for Falls  Goal: Patient will remain free of falls  Description: INTERVENTIONS:  - Educate patient/family on patient safety including physical limitations  - Instruct patient to call for assistance with activity   - Consider consulting OT/PT to assist with strengthening/mobility based on AM PAC & JH-HLM score  - Consult OT/PT to assist with strengthening/mobility   - Keep Call bell within reach  - Keep bed low and locked with side rails adjusted as appropriate  - Keep care items and personal belongings within reach  - Initiate and maintain comfort rounds  - Make Fall Risk Sign visible to staff  - Offer Toileting every 2 Hours, in advance of need  - Initiate/Maintain bed alarm  - Obtain necessary fall risk management equipment:   - Apply yellow socks and bracelet for high fall risk patients  - Consider moving patient to room near nurses station  Outcome: Progressing     Problem: PAIN - ADULT  Goal: Verbalizes/displays adequate comfort level or baseline comfort level  Description: Interventions:  - Encourage patient to monitor pain and request assistance  - Assess pain using appropriate pain scale  - Administer analgesics as ordered based on type and severity of pain and evaluate response  - Implement non-pharmacological measures as appropriate and evaluate response  - Consider cultural and social influences on pain and pain management  - Notify physician/advanced practitioner if interventions unsuccessful or patient reports new pain  - Educate patient/family on pain management process including their role and importance of  reporting pain   - Provide non-pharmacologic/complimentary pain relief interventions  Outcome: Progressing     Problem: INFECTION - ADULT  Goal: Absence or prevention of progression during hospitalization  Description: INTERVENTIONS:  - Assess and monitor for signs and symptoms of infection  - Monitor lab/diagnostic results  - Monitor all insertion sites, i.e. indwelling  lines, tubes, and drains  - Monitor endotracheal if appropriate and nasal secretions for changes in amount and color  - Holland appropriate cooling/warming therapies per order  - Administer medications as ordered  - Instruct and encourage patient and family to use good hand hygiene technique  - Identify and instruct in appropriate isolation precautions for identified infection/condition  Outcome: Progressing     Problem: SAFETY ADULT  Goal: Patient will remain free of falls  Description: INTERVENTIONS:  - Educate patient/family on patient safety including physical limitations  - Instruct patient to call for assistance with activity   - Consider consulting OT/PT to assist with strengthening/mobility based on AM PAC & -HLM score  - Consult OT/PT to assist with strengthening/mobility   - Keep Call bell within reach  - Keep bed low and locked with side rails adjusted as appropriate  - Keep care items and personal belongings within reach  - Initiate and maintain comfort rounds  - Make Fall Risk Sign visible to staff  - Offer Toileting every 2 Hours, in advance of need  - Initiate/Maintain bed alarm  - Obtain necessary fall risk management equipment:   - Apply yellow socks and bracelet for high fall risk patients  - Consider moving patient to room near nurses station  Outcome: Progressing  Goal: Maintain or return to baseline ADL function  Description: INTERVENTIONS:  -  Assess patient's ability to carry out ADLs; assess patient's baseline for ADL function and identify physical deficits which impact ability to perform ADLs (bathing, care of mouth/teeth, toileting, grooming, dressing, etc.)  - Assess/evaluate cause of self-care deficits   - Assess range of motion  - Assess patient's mobility; develop plan if impaired  - Assess patient's need for assistive devices and provide as appropriate  - Encourage maximum independence but intervene and supervise when necessary  - Involve family in performance of ADLs  - Assess for  home care needs following discharge   - Consider OT consult to assist with ADL evaluation and planning for discharge  - Provide patient education as appropriate  - Monitor functional capacity and physical performance, use of AM PAC & JH-HLM   - Monitor gait, balance and fatigue with ambulation    Outcome: Progressing  Goal: Maintains/Returns to pre admission functional level  Description: INTERVENTIONS:  - Perform AM-PAC 6 Click Basic Mobility/ Daily Activity assessment daily.  - Set and communicate daily mobility goal to care team and patient/family/caregiver.   - Collaborate with rehabilitation services on mobility goals if consulted  - Perform Range of Motion 3 times a day.  - Reposition patient every 2 hours.  - Dangle patient 3 times a day  - Stand patient 3 times a day  - Ambulate patient 3 times a day  - Out of bed to chair 3 times a day   - Out of bed for meals 3 times a day  - Out of bed for toileting  - Record patient progress and toleration of activity level   Outcome: Progressing     Problem: DISCHARGE PLANNING  Goal: Discharge to home or other facility with appropriate resources  Description: INTERVENTIONS:  - Identify barriers to discharge w/patient and caregiver  - Arrange for needed discharge resources and transportation as appropriate  - Identify discharge learning needs (meds, wound care, etc.)  - Arrange for interpretive services to assist at discharge as needed  - Refer to Case Management Department for coordinating discharge planning if the patient needs post-hospital services based on physician/advanced practitioner order or complex needs related to functional status, cognitive ability, or social support system  Outcome: Progressing     Problem: Knowledge Deficit  Goal: Patient/family/caregiver demonstrates understanding of disease process, treatment plan, medications, and discharge instructions  Description: Complete learning assessment and assess knowledge base.  Interventions:  - Provide  teaching at level of understanding  - Provide teaching via preferred learning methods  Outcome: Progressing     Problem: GASTROINTESTINAL - ADULT  Goal: Minimal or absence of nausea and/or vomiting  Description: INTERVENTIONS:  - Administer IV fluids if ordered to ensure adequate hydration  - Maintain NPO status until nausea and vomiting are resolved  - Nasogastric tube if ordered  - Administer ordered antiemetic medications as needed  - Provide nonpharmacologic comfort measures as appropriate  - Advance diet as tolerated, if ordered  - Consider nutrition services referral to assist patient with adequate nutrition and appropriate food choices  Outcome: Progressing     Problem: METABOLIC, FLUID AND ELECTROLYTES - ADULT  Goal: Electrolytes maintained within normal limits  Description: INTERVENTIONS:  - Monitor labs and assess patient for signs and symptoms of electrolyte imbalances  - Administer electrolyte replacement as ordered  - Monitor response to electrolyte replacements, including repeat lab results as appropriate  - Instruct patient on fluid and nutrition as appropriate  Outcome: Progressing  Goal: Glucose maintained within target range  Description: INTERVENTIONS:  - Monitor Blood Glucose as ordered  - Assess for signs and symptoms of hyperglycemia and hypoglycemia  - Administer ordered medications to maintain glucose within target range  - Assess nutritional intake and initiate nutrition service referral as needed  Outcome: Progressing     Problem: MUSCULOSKELETAL - ADULT  Goal: Maintain or return mobility to safest level of function  Description: INTERVENTIONS:  - Assess patient's ability to carry out ADLs; assess patient's baseline for ADL function and identify physical deficits which impact ability to perform ADLs (bathing, care of mouth/teeth, toileting, grooming, dressing, etc.)  - Assess/evaluate cause of self-care deficits   - Assess range of motion  - Assess patient's mobility  - Assess patient's  need for assistive devices and provide as appropriate  - Encourage maximum independence but intervene and supervise when necessary  - Involve family in performance of ADLs  - Assess for home care needs following discharge   - Consider OT consult to assist with ADL evaluation and planning for discharge  - Provide patient education as appropriate  Outcome: Progressing

## 2025-06-19 NOTE — CASE MANAGEMENT
Case Management Assessment & Discharge Planning Note    Patient name Shun Linda  Location ICU 10/ICU 10-01 MRN [id number]  : 1985 Date 2025       Current Admission Date: 2025  Current Admission Diagnosis:DKA (diabetic ketoacidosis) (HCC)   Patient Active Problem List    Diagnosis Date Noted    Metabolic acidosis 2025    DKA (diabetic ketoacidosis) (HCC) 2025    Cocaine use 2025    SIRS (systemic inflammatory response syndrome) (HCC) 2025    HTN (hypertension) 2025    Factor 5 Leiden mutation, heterozygous (HCC) 2025    Flank pain 2025    Obesity 2025    Hepatic steatosis 2025    Hypertriglyceridemia 2025      LOS (days): 1  Geometric Mean LOS (GMLOS) (days): 3  Days to GMLOS:1.5     OBJECTIVE:    Risk of Unplanned Readmission Score: 10.34         Current admission status: Inpatient  Referral Reason: Other (d/c planning)    Preferred Pharmacy:   [id number] [place] [id number] [place] [address] [facility] [address]  Phone: [phone] Fax: [phone]    [facility] [address] [address]  Phone: [phone] Fax: [phone]    Primary Care Provider: Isabelita Annulysse, CRNP    Primary Insurance: [email or web address]  Secondary Insurance:     ASSESSMENT:  Active Health Care Proxies    There are no active Health Care Proxies on file.       Advance Directives  Does patient have a Health Care POA?: No  Was patient offered paperwork?: Yes (declined paperwork)  Does patient have Advance Directives?: No  Was patient offered paperwork?: Yes (declined paperwork)         Readmission Root Cause  30 Day Readmission: No    Patient Information  Admitted from:: Home  Mental Status: Alert  During Assessment patient was accompanied by: Not accompanied during assessment  Assessment information provided by:: Patient  Primary  Caregiver: Self  Support Systems: Spouse/significant other, Parent  County of Residence: [place]  What city do you live in?: [place]  Home entry access options. Select all that apply.: Stairs  Number of steps to enter home.: 1  Do the steps have railings?: No  Type of Current Residence: [facility]  Living Arrangements: Lives w/ Family members (parents)  Is patient a ?: No    Activities of Daily Living Prior to Admission  Functional Status: Independent  Completes ADLs independently?: Yes  Ambulates independently?: Yes  Does patient use assisted devices?: No  Does patient currently own DME?: No  Does patient have a history of Outpatient Therapy (PT/OT)?: Yes  Does the patient have a history of Short-Term Rehab?: No  Does patient have a history of HHC?: No  Does patient currently have HHC?: No         Patient Information Continued  Income Source: Unemployed  Does patient have prescription coverage?: Yes ([facility])  Can the patient afford their medications and any related supplies (such as glucometers or test strips)?: Yes  Does patient receive dialysis treatments?: No  Does patient have a history of substance abuse?: Yes  Historical substance use preference: Cocaine, Alcohol/ETOH  Is patient currently in treatment for substance abuse?: No. Patient declined treatment information. (went for Alcohol rehab over 10 yrs ago)  Does patient have a history of Mental Health Diagnosis?: Yes (anxiety/bipolar)  Has patient received inpatient treatment related to mental health in the last 2 years?: Yes (2024 [place])         Means of Transportation  Means of Transport to Appts:: Family transport          DISCHARGE DETAILS:    Discharge planning discussed with:: patient & Sonam (evgeny)  spoke to her via  speaker phone on pt's cell phone  Freedom of Choice: Yes  Comments - Freedom of Choice: pt denies any d/c needs- price check is needs fir his new rx for insukin- pt will need a glucometer  CM contacted  family/caregiver?: Yes             Contacts  Patient Contacts: Sonam  Relationship to Patient:: Other (Comment) (fiance)  Contact Method: Phone  Phone Number: [phone]  Reason/Outcome: Discharge Planning    Requested Home Health Care         Is the patient interested in HHC at discharge?: No    DME Referral Provided  Referral made for DME?: No    Other Referral/Resources/Interventions Provided:  Interventions: Prescription Price Check  Referral Comments: pt not stable - pt needs diabetic teaching - endo consult- Labs    Would you like to participate in our [facility] service program?  : No - Declined    Treatment Team Recommendation: Home (home with family & outpt follow up- family)

## 2025-06-19 NOTE — UTILIZATION REVIEW
NOTIFICATION OF INPATIENT ADMISSION   AUTHORIZATION REQUEST   SERVICING FACILITY:   [place] [address]  Tax ID: [id number]  NPI: [id number]   ATTENDING PROVIDER:  Attending Name and NPI#: Damon Butler Md Phd [[id number]]  Address: [address] [place] [address]  Phone: [phone]     ADMISSION INFORMATION:  Place of Service: Inpatient Acute [facility]  Place of Service Code: 21  Inpatient Admission Date/Time: 6/18/25  6:55 AM  Discharge Date/Time: No discharge date for patient encounter.  Admitting Diagnosis Code/Description:  Abdominal pain [R10.9]  DKA (diabetic ketoacidosis) (HCC) [E11.10]     UTILIZATION REVIEW CONTACT:  Abril Humphreys Utilization   Network Utilization Review Department  Phone: [phone]  Fax: [phone]  Email: [email or web address]  Contact for approvals/pending authorizations, clinical reviews, and discharge.     PHYSICIAN ADVISORY SERVICES:  Medical Necessity Denial & Njrq-sn-Mpqd Review  Phone: [phone]  Fax: [phone]  Email: [email or web address]     DISCHARGE SUPPORT TEAM:  For Patients Discharge Needs & Updates  Phone: [phone] opt. 2 Fax: [phone]  Email: [email or web address]

## 2025-06-19 NOTE — CONSULTS
Consultation - Shun Linda 39 y.o. male MRN: [id number]    Unit/Bed#: ICU 10-01 Encounter: [id number]  Administrative Statements   VIRTUAL CARE DOCUMENTATION:     1. This service was provided via Telemedicine using Placester Kit     2. Parties in the room with patient during teleconsult Patient only    3. Confidentiality My office door was closed     4. Participants No one else was in the room    5. Patient acknowledged consent and understanding of privacy and security of the  Telemedicine consult. I informed the patient that I have reviewed their record in Epic and presented the opportunity for them to ask any questions regarding the visit today.  The patient agreed to participate.    6. I have spent a total time of 40 minutes in caring for this patient on the day of the visit/encounter including Diagnostic results, Prognosis, Risks and benefits of tx options, Instructions for management, Patient and family education, Importance of tx compliance, Risk factor reductions, Impressions, Counseling / Coordination of care, Documenting in the medical record, Reviewing/placing orders in the medical record (including tests, medications, and/or procedures), Obtaining or reviewing history  , and Communicating with other healthcare professionals , not including the time spent for establishing the audio/video connection.         Assessment & Plan     * DKA (diabetic ketoacidosis) ([facility])  Assessment & Plan  Lab Results   Component Value Date    HGBA1C 11.6 (H) 06/18/2025       Recent Labs     06/19/25  0606 06/19/25  0808 06/19/25  1110 06/19/25  1600   POCGLU 240* 162* 186* 226*       Blood Sugar Average: Last 72 hrs:  (P) [id number]    No known history of diabetes, last A1c in 2019 was 5.6, he reports hyperglycemia symptoms prior to his admission.  Presented with diabetic ketoacidosis, which currently resolved and primary team transitioned him off insulin infusion with Lantus of 30 units first dose this morning  and Humalog 8 units 3 times daily AC plus correctional insulin.  I recommend to increase Lantus to 36 units every morning and Humalog 10 units 3 times daily AC.  Continue correctional insulin.  Continue to monitor blood sugar over time and make adjustments to the regimen if necessary   For discharge, if Lantus solostar is not the preferred basal insulin for the patient's insurance company, please substitute with Tresiba flexpen, Basaglar Kwikpen , Levemir flexpen, Toujeo solostar pen  or equivalent insulin vials at the same dose instead.      For discharge, if Humalog  Kwik pen is not the preferred mealtime insulin for the patient's insurance, please substitute with NovoLog flexpen, Fiasp  flextouch, Admelog solostar  or Apidra solostar pen or equivalent insulin vials at the same dose instead.     If basal/bolus therapy is not covered or affordable, please substitute with Novolin 70/30 Flexpen OR Novolin 70/30 vial at equivalent  dose     Please prescribe insulin pen needles, glucometer, test strips, lancets and insulin syringes (only when using insulin vials) on discharge.                 CC: Diabetes Consult    History of Present Illness     HPI: Shun Linda is a 39 y.o. year old male with a history of drug abuse who presented with right lower quadrant abdominal pain radiating to his scrotum.  He was found to have hyperglycemia of 371 mg/dL, with anion gap of 20 and bicarb of 13 and beta-hydroxybutyrate of 4.71.  Patient is admitted for diabetic ketoacidosis, he was started on DKA protocol, anion gap closed and acidosis resolved.  Patient is transition to Lantus 30 units daily and Humalog 8 units 3 times daily AC plus correctional insulin.  Endocrinology is consulted for diabetes management.    He reports no prior history of diabetes, although he reports hypoglycemia symptoms including polyuria, polydipsia, vision changes.  Reports extensive family history of diabetes in both sides of family.  Inpatient  consult to Endocrinology  Consult performed by: Marcus Wan MD  Consult ordered by: CHRISTINA Mai          Review of Systems : as per HPI    Historical Information   Past Medical History[1]  Past Surgical History[2]  Social History   Social History     Substance and Sexual Activity   Alcohol Use Not Currently     Social History     Substance and Sexual Activity   Drug Use Yes    Types: Cocaine     Tobacco Use History[3]  Family History: Family History[4]    Meds/Allergies   Current Medications[5]  Allergies[6]    Objective   Vitals: Blood pressure 146/68, pulse 92, temperature 97.9 °F (36.6 °C), temperature source Temporal, resp. rate 18, height 6' (1.829 m), weight 118 kg (260 lb 9.3 oz), SpO2 94%.    Intake/Output Summary (Last 24 hours) at 6/19/2025 1605  Last data filed at 6/19/2025 1401  Gross per 24 hour   Intake 2696.81 ml   Output 1350 ml   Net 1346.81 ml     Invasive Devices       Peripheral Intravenous Line  Duration             Peripheral IV 06/18/25 Right Forearm 1 day                    Physical Exam  Constitutional:       General: He is not in acute distress.     Appearance: He is not ill-appearing.   Pulmonary:      Effort: Pulmonary effort is normal. No respiratory distress.     Neurological:      Mental Status: He is oriented to person, place, and time.         The history was obtained from the review of the chart, patient.    Lab Results:   Results from last 7 days   Lab Units 06/18/25  0523   HEMOGLOBIN A1C % 11.6*     Lab Results   Component Value Date    WBC 10.17 (H) 06/19/2025    HGB 12.6 06/19/2025    HCT 36.5 06/19/2025    MCV 91 06/19/2025     06/19/2025     Lab Results   Component Value Date/Time    BUN 14 06/19/2025 07:38 AM    BUN 12 04/16/2019 01:21 PM    K 3.4 (L) 06/19/2025 07:38 AM    K 4.5 04/16/2019 01:21 PM     06/19/2025 07:38 AM     04/16/2019 01:21 PM    CO2 22 06/19/2025 07:38 AM    CO2 23 04/16/2019 01:21 PM    CREATININE 0.91 06/19/2025 07:38 AM  "   CREATININE 1.14 04/16/2019 01:21 PM    AST 62 (H) 06/19/2025 07:38 AM    AST 25 04/16/2019 01:21 PM    ALT 49 06/19/2025 07:38 AM    ALT 48 04/16/2019 01:21 PM    TP 6.0 (L) 06/19/2025 07:38 AM    TP 7.6 04/16/2019 01:21 PM    ALB 3.7 06/19/2025 07:38 AM    ALB 4.4 04/16/2019 01:21 PM    GLOB 2.7 06/26/2018 12:57 PM     Recent Labs     06/18/25  0523   HDL 23*   TRIG 1,249*     No results found for: \"MICROALBUR\", \"[id number]\"  POC Glucose (mg/dl)   Date Value   06/19/2025 226 (H)   06/19/2025 186 (H)   06/19/2025 162 (H)   06/19/2025 240 (H)   06/19/2025 91   06/19/2025 67   06/19/2025 222 (H)   06/18/2025 209 (H)   06/18/2025 195 (H)   06/18/2025 190 (H)       Imaging Studies: Results Review Statement: No pertinent imaging studies reviewed.    Portions of the record may have been created with voice recognition software.         [1]   Past Medical History:  Diagnosis Date    Anxiety     Bipolar affective disorder (HCC)     Hypertension    [2]   Past Surgical History:  Procedure Laterality Date    HERNIA REPAIR      MANDIBLE FRACTURE SURGERY      ORTHOPEDIC SURGERY     [3]   Social History  Tobacco Use   Smoking Status Former    Current packs/day: 0.25    Types: Cigarettes   Smokeless Tobacco Not on file   [4] No family history on file.  [5]   Current Facility-Administered Medications   Medication Dose Route Frequency Provider Last Rate Last Admin    acetaminophen (TYLENOL) tablet 650 mg  650 mg Oral Q6H PRN Tori Stezenko, CRNP   650 mg at 06/19/25 0829    apixaban (ELIQUIS) tablet 5 mg  5 mg Oral BID Tori Stezenko, CRNP   5 mg at 06/19/25 0821    atorvastatin (LIPITOR) tablet 40 mg  40 mg Oral Daily With Dinner Tori Stezenko, CRNP        calcium carbonate (TUMS) chewable tablet 500 mg  500 mg Oral Daily PRN Tori Stezenko, CRNP   500 mg at 06/19/25 1112    chlorhexidine (PERIDEX) 0.12 % oral rinse 15 mL  15 mL Mouth/Throat Q12H [facility] CHRISTINA Carrington   15 mL at 06/19/25 0821    " cyclobenzaprine (FLEXERIL) tablet 10 mg  10 mg Oral TID PRN CHRISTINA Carrington        fenofibrate (TRICOR) tablet 145 mg  145 mg Oral Daily HCRISTINA Carrington   145 mg at 06/19/25 0821    [START ON 6/20/2025] insulin glargine (LANTUS) subcutaneous injection 30 Units 0.3 mL  30 Units Subcutaneous HS CHRISTINA Carrington        insulin lispro (HumALOG/ADMELOG) 100 units/mL subcutaneous injection 1-6 Units  1-6 Units Subcutaneous HS CHRISTINA Carrington        insulin lispro (HumALOG/ADMELOG) 100 units/mL subcutaneous injection 2-12 Units  2-12 Units Subcutaneous TID AC CHRISTINA Carrington   2 Units at 06/19/25 1116    insulin lispro (HumALOG/ADMELOG) 100 units/mL subcutaneous injection 8 Units  8 Units Subcutaneous TID With Meals CHRISTINA Carrington   8 Units at 06/19/25 1300    ondansetron (ZOFRAN) injection 4 mg  4 mg Intravenous Q6H PRN CHRISTINA Mai        pantoprazole (PROTONIX) EC tablet 40 mg  40 mg Oral Early Morning CHRISTINA Carrington   40 mg at 06/19/25 1112   [6]   Allergies  Allergen Reactions    Azithromycin     Erythromycin

## 2025-06-19 NOTE — PROGRESS NOTES
Progress Note - Critical Care/ICU   Name: Shun Linda 39 y.o. male I MRN: [id number]  Unit/Bed#: ICU 10-01 I Date of Admission: 6/18/2025   Date of Service: 6/19/2025 I Hospital Day: 1      Assessment & Plan  DKA (diabetic ketoacidosis) ([facility])  Lab Results   Component Value Date    HGBA1C 11.6 (H) 06/18/2025       Recent Labs     06/18/25  2348 06/19/25  0140 06/19/25  0404 06/19/25  0425   POCGLU 209* 222* 67 91       Blood Sugar Average: Last 72 hrs:  (P) [id number]    POA AEB glucose 371, BHB 4.71, AG 20 w/bicarb 13, VBG pH 7.283  Appears to be new onset - reports polydipsia, polyphagia, and polydipsia as OP  Is s/p aggressive volume resuscitaiton  DKA insulin gtt transitioned to non-DKA insulin gtt - will need to be transitioned to SQ insulin regimen   Endocrinology consulted given new onset and will require OP follow-up  Nutrition consulted for diabetes education  Diabetic diet  Flank pain  Presented w/R-sided flank pain that radiates to scrotum  Possibly in setting of kidney stone?  CT A/P w/out contrast w/no acute findings  Scrotum US WNL  UA w/2+ blood  Is s/p aggressive volume resuscitation   Continue scheduled APAP/Toradol and PRN Oxycodone/Dilaudid  Strain all urine    Metabolic acidosis  In setting of DKA  Monitor for worsening acidosis  SIRS (systemic inflammatory response syndrome) ([facility])  POA AEB tachycardia and leukocytosis  Suspect this is reactive in setting of DKA  No evidence of infection at this time  Trend fever and WBC curve  Hypertriglyceridemia  TRIG 1249  Likely diet-related  Consider starting Tricor and statin   Low cholesterol/triglyceride diet  Cocaine use  Remote  Snorted cocaine prior to admission  Last use 1 year ago - encourage complete cessation  HTN (hypertension)  Previously on Lisinopril and Lopressor, however has not been compliant w/these  Factor 5 Leiden mutation, heterozygous ([facility])  Has hx of unprovoked DVT/PE in 2019 for which he was started on Eliquis, however  it does not appear he has continued to take this  Will need to discuss resuming AC w/patient today - will continue DVT ppx for now  Obesity  Encourage healthy lifestyle modifications    Disposition: Stepdown Level 1    ICU Core Measures     A: Assess, Prevent, and Manage Pain Has pain been assessed? Yes  Need for changes to pain regimen? No   B: Both SAT/SAT  N/A   C: Choice of Sedation RASS Goal: 0 Alert and Calm  Need for changes to sedation or analgesia regimen? No   D: Delirium CAM-ICU: Negative   E: Early Mobility  Plan for early mobility? Yes   F: Family Engagement Plan for family engagement today? Yes         Prophylaxis:  VTE VTE covered by:  enoxaparin, Subcutaneous       Stress Ulcer  not ordered         24 Hour Events :     Transitioned from DKA to non-DKA insulin gtt  IVF stopped as now tolerating diet  No acute events overnight      Subjective   Review of Systems: Review of Systems   Constitutional:  Negative for chills and fever.   HENT:  Negative for congestion.    Eyes:  Negative for visual disturbance.   Respiratory:  Negative for shortness of breath.    Cardiovascular:  Negative for chest pain.   Gastrointestinal:  Negative for abdominal pain, nausea and vomiting.   Genitourinary:  Negative for difficulty urinating.   Musculoskeletal:  Negative for back pain.   Neurological:  Negative for dizziness, light-headedness and headaches.   Psychiatric/Behavioral:  Negative for confusion.        Objective :                   Vitals I/O      Most Recent Min/Max in 24hrs   Temp 97.5 °F (36.4 °C) Temp  Min: 96.8 °F (36 °C)  Max: 98.6 °F (37 °C)   Pulse 87 Pulse  Min: 76  Max: 116   Resp (!) 27 Resp  Min: 16  Max: 34   /63 BP  Min: 125/71  Max: 156/97   O2 Sat 94 % SpO2  Min: 92 %  Max: 97 %      Intake/Output Summary (Last 24 hours) at 6/19/2025 0521  Last data filed at 6/18/2025 2327  Gross per 24 hour   Intake 8073.33 ml   Output 3050 ml   Net 5023.33 ml       Diet Tank/CHO Controlled; Consistent  Carbohydrate Diet Level 3 (6 carb servings/90 grams CHO/meal)    Invasive Monitoring           Physical Exam   Physical Exam  Vitals and nursing note reviewed.   Eyes:      Extraocular Movements: Extraocular movements intact.      Pupils: Pupils are equal, round, and reactive to light.   Skin:     General: Skin is warm and dry.      Capillary Refill: Capillary refill takes less than 2 seconds.   Cardiovascular:      Rate and Rhythm: Normal rate and regular rhythm.      Pulses: Normal pulses.      Heart sounds: Normal heart sounds.   Musculoskeletal:      Right lower leg: No edema.      Left lower leg: No edema.   Abdominal: General: Bowel sounds are normal. There is no distension.      Palpations: Abdomen is soft.   Constitutional:       General: He is awake. He is not in acute distress.  Pulmonary:      Effort: Pulmonary effort is normal.      Breath sounds: Normal breath sounds.   Psychiatric:         Behavior: Behavior is cooperative.   Neurological:      General: No focal deficit present.      Mental Status: He is alert and oriented to person, place and time.          Diagnostic Studies        Lab Results: I have reviewed the following results:     Medications:  Scheduled PRN   acetaminophen, 1,000 mg, Q6H SAMANTHA  calcium gluconate, 2 g, Once  chlorhexidine, 15 mL, Q12H SAMANTHA  enoxaparin, 40 mg, Q24H SAMANTHA  ketorolac, 30 mg, Q6H SAMANTHA      oxyCODONE, 2.5 mg, Q4H PRN   Or  oxyCODONE, 5 mg, Q4H PRN   Or  HYDROmorphone, 1 mg, Q4H PRN  ondansetron, 4 mg, Q6H PRN       Continuous    insulin regular (HumuLIN R,NovoLIN R) 1 Units/mL in sodium chloride 0.9 % 100 mL infusion, 0.3-21 Units/hr, Last Rate: Stopped (06/19/25 0408)         Labs:   CBC    Recent Labs     06/18/25  0523   WBC 16.22*   HGB 16.3   HCT 47.0        BMP    Recent Labs     06/18/25  1237 06/18/25  1623   SODIUM 133* 131*   K 3.6 3.8    102   CO2 17* 18*   AGAP 12 11   BUN 12 11   CREATININE 1.02 0.98   CALCIUM 8.9 8.3*       Coags    No recent  results     Additional Electrolytes  Recent Labs     06/18/25  1237 06/18/25  1623 06/19/25  0441   MG 1.9 1.8*  --    PHOS 2.3* 3.3  --    CAIONIZED  --   --  0.99*          Blood Gas    No recent results  Recent Labs     06/18/25  0601   PHVEN 7.283*   FAP0THB 28.8*   PO2VEN 107.9*   RRG0CPE 13.3*   BEVEN -11.6   A7MTDFM 95.8*    LFTs  No recent results    Infectious  Recent Labs     06/18/25  0523   PROCALCITONI 0.11     Glucose  Recent Labs     06/18/25  0523 06/18/25  0920 06/18/25  1237 06/18/25  1623   GLUC 371* 190* 81 243*

## 2025-06-19 NOTE — PROGRESS NOTES
0800-  Assessment completed, findings in flowsheet.  Pt. Educated on plan for the day as it related to Insulin.  Pt. Educated about long acting vs short acting.  0900-  Insulin drip stopped.  1300-  Diabetic education continued throughout the day. Pt. Given written diabetic materials.  Pt. Also practiced utilizing the Insulin Pen, was able to check his own BG with our machine and gave himself an Insulin injection.

## 2025-06-19 NOTE — ASSESSMENT & PLAN NOTE
POA AEB tachycardia and leukocytosis  Suspect this is reactive in setting of DKA  No evidence of infection at this time  Trend fever and WBC curve

## 2025-06-19 NOTE — PLAN OF CARE
Pt. Titrated off on Insulin drip.  Diabetic teaching initiated.  Pt. Ambulating around the room.

## 2025-06-19 NOTE — UTILIZATION REVIEW
Initial Clinical Review    Admission: Date/Time/Statement:   Admission Orders (From admission, onward)       Ordered        06/18/25 0655  INPATIENT ADMISSION  Once                          Orders Placed This Encounter   Procedures    INPATIENT ADMISSION     Standing Status:   Standing     Number of Occurrences:   1     Level of Care:   Level 1 Stepdown [13]     Estimated length of stay:   More than 2 Midnights     Certification:   I certify that inpatient services are medically necessary for this patient for a duration of greater than two midnights. See H&P and MD Progress Notes for additional information about the patient's course of treatment.     ED Arrival Information       Expected   -    Arrival   6/18/2025 05:00    Acuity   Urgent              Means of arrival   Ambulance    Escorted by   [facility]    Service   Critical Care/ICU    Admission type   Emergency              Arrival complaint   abdominal pain             Chief Complaint   Patient presents with    Abdominal Pain     RLQ abdominal pain going into testicles, started around 0330 this morning, nausea; states he did 2 lines of cocaine last night- first time in a year       Initial Presentation: 39 y.o. male w/ PMH HTN, CKD, Factor 5 Leiden, Bipolar, Anxiety and Drug abuse who presented by EMS to [facility] ED. Admitted as Inpatient ICU stepdown level 1 for evaluation and treatment of New onset Hyperglycemia, DKA, Abd pain. Metabolic acidosis.      Presented w/ acute onset right sided pain radiating to scrotum. Scrotal u/s and CTAP negative. On exam, no abd tenderness at this time. GCS 15. Abnormal Labs Na 132, CO2 13, Anion Gap 20, Glucose 371, Cholesterol 277, Triglycerides 1,249, HDL 23 WBC 16.22. UA 2+blood 3+ketone 2+ protein. Lactic acid 1.9. Please see below med list for meds given in the ED.     Plan: Admit ICU stepdown, NPO, sips of Clear liq, insulin gtt-transition to non CC insulin gtt once gap closed x2, serial BMP, IV  fluids, Pain control, Strain urine. Trend WBC and fever curve. Start Statin and Fenofibrate. Endocrinology consulted given new diagnosis Diabetes. F/U labs in am.     Date: 6/19/25   Day 2: Level of care change to Med Surg.   Pt transitioned from DKA to non-DKA insulin gtt. IV fluids stopped as now pt tolerating diet. No acute events overnight. Abnormal labs: K 3.4, Glucose 170, WBC 10.17. Plan: Non-DKA Insulin gtt, transition to SQ insulin regimen. Endocrinology consult pending. Will require outpatient follow up. Diabetic, Low cholesterol/triglyceride diet. Nutrition consulted for diabetes education. Strain all urine. Monitor BMP. Trend WBC and Fever curve. Pain control   ED Treatment-Medication Administration from 06/18/2025 0500 to 06/18/2025 0805         Date/Time Order Dose Route Action     06/18/2025 0523 sodium chloride 0.9 % bolus 1,000 mL 1,000 mL Intravenous New Bag     06/18/2025 0522 ondansetron (ZOFRAN) injection 4 mg 4 mg Intravenous Given     06/18/2025 0522 ketorolac (TORADOL) injection 15 mg 15 mg Intravenous Given     06/18/2025 0522 fentaNYL injection 25 mcg 25 mcg Intravenous Given     06/18/2025 0542 fentaNYL injection 50 mcg 50 mcg Intravenous Given     06/18/2025 0706 sodium chloride 0.9 % bolus 1,000 mL 1,000 mL Intravenous New Bag     06/18/2025 0624 insulin regular (HumuLIN R,NovoLIN R) injection 6 Units 6 Units Intravenous Given     06/18/2025 0716 HYDROmorphone (DILAUDID) injection 0.5 mg 0.5 mg Intravenous Given     06/18/2025 0758 lactated ringers infusion 500 mL/hr Intravenous New Bag     06/18/2025 0802 lactated ringers infusion 500 mL/hr Intravenous Restarted            Scheduled Medications:  apixaban, 5 mg, Oral, BID  atorvastatin, 40 mg, Oral, Daily With Dinner  chlorhexidine, 15 mL, Mouth/Throat, Q12H SAMANTHA  fenofibrate, 145 mg, Oral, Daily  [START ON 6/20/2025] insulin glargine, 30 Units, Subcutaneous, HS  insulin lispro, 1-6 Units, Subcutaneous, HS  insulin lispro, 2-12 Units,  Subcutaneous, TID AC  insulin lispro, 8 Units, Subcutaneous, TID With Meals  acetaminophen (Ofirmev) injection 1,000 mg  Dose: 1,000 mg  Freq: Every 6 hours scheduled Route: IV  Last Dose: 1,000 mg (06/19/25 0138)  Start: 06/18/25 2000 End: 06/19/25 0755  calcium gluconate 2 g in sodium chloride 0.9% 100 mL (premix)  Dose: 2 g  Freq: Once Route: IV  Last Dose: 2 g (06/19/25 0604)  Start: 06/19/25 0530 End: 06/19/25 0704  magnesium Oxide (MAG-OX) tablet 800 mg  Dose: 800 mg  Freq: Once Route: PO  Start: 06/18/25 1700 End: 06/18/25 1712  magnesium Oxide (MAG-OX) tablet 800 mg  Dose: 800 mg  Freq: Once Route: PO  Start: 06/18/25 1330 End: 06/18/25 1338  magnesium Oxide (MAG-OX) tablet 800 mg  Dose: 800 mg  Freq: Once Route: PO  Start: 06/18/25 1045 End: 06/18/25 1112  potassium chloride (Klor-Con M20) CR tablet 20 mEq  Dose: 20 mEq  Freq: Once Route: PO  Start: 06/18/25 1700 End: 06/18/25 1712  potassium chloride (Klor-Con M20) CR tablet 20 mEq  Dose: 20 mEq  Freq: Once Route: PO  Start: 06/18/25 1045 End: 06/18/25 1112  potassium chloride (Klor-Con M20) CR tablet 40 mEq  Dose: 40 mEq  Freq: Once Route: PO  Start: 06/18/25 1330 End: 06/18/25 1338  sodium phosphate 12 mmol in sodium chloride 0.9 % 100 mL Infusion  Dose: 12 mmol  Freq: Once Route: IV  Last Dose: Stopped (06/18/25 1612)  Start: 06/18/25 1330 End: 06/18/25 1612  potassium-sodium phosphateS (K-PHOS,PHOSPHA 250) -250 mg tablet 2 tablet  Dose: 2 tablet  Freq: Once Route: PO  Start: 06/18/25 1330 End: 06/18/25 1338    Continuous IV Infusions:  dextrose 5 % in lactated Ringer's infusion  Rate: 250 mL/hr Dose: 250 mL/hr  Freq: Continuous Route: IV  Last Dose: Stopped (06/18/25 1841)  Start: 06/18/25 0745 End: 06/18/25 1837  insulin regular (HumuLIN R,NovoLIN R) 1 Units/mL in sodium chloride 0.9 % 100 mL infusion  Rate: 0.3-21 mL/hr Dose: 0.3-21 Units/hr  Freq: Titrated Route: IV  Last Dose: Stopped (06/19/25 0915)  Start: 06/18/25 1845 End: 06/19/25  0900  insulin regular (HumuLIN R,NovoLIN R) 1 Units/mL in sodium chloride 0.9 % 100 mL infusion  Rate: 0.1-30 mL/hr Dose: 0.1-30 Units/hr  Freq: Continuous Route: IV  Last Dose: Stopped (06/18/25 1841)  Start: 06/18/25 0745 End: 06/18/25 1837  lactated ringers infusion  Rate: 500 mL/hr Dose: 500 mL/hr  Freq: Continuous Route: IV  Last Dose: Stopped (06/18/25 1158)  Start: 06/18/25 0745 End: 06/18/25 1157  lactated ringers infusion  Rate: 250 mL/hr Dose: 250 mL/hr  Freq: Continuous Route: IV  Last Dose: Stopped (06/18/25 1842)  Start: 06/18/25 1158 End: 06/18/25 1837  multi-electrolyte (Plasmalyte-A/Isolyte-S PH 7.4/Normosol-R) IV solution  Rate: 75 mL/hr Dose: 75 mL/hr  Freq: Continuous Route: IV  Last Dose: Stopped (06/19/25 0136)  Start: 06/18/25 1845 End: 06/19/25 0116     PRN Meds:  acetaminophen, 650 mg, Oral, Q6H PRN- given x1 6/19  oxyCODONE, 2.5 mg, Oral, Q4H PRN   Or  oxyCODONE, 5 mg, Oral, Q4H PRN- given x1 6/18, x1 6/18   Or  HYDROmorphone, 1 mg, Intravenous, Q4H PRN- given x4 6/18, x2 6/19  ondansetron, 4 mg, Intravenous, Q6H PRN      ED Triage Vitals [06/18/25 0506]   Temperature Pulse Respirations Blood Pressure SpO2 Pain Score   98.3 °F (36.8 °C) (!) 110 20 156/89 94 % 10 - Worst Possible Pain     Weight (last 2 days)       Date/Time Weight    06/18/25 0903 118 (260.58)    06/18/25 0506 127 (280)            Vital Signs (last 3 days)       Date/Time Temp Pulse Resp BP MAP (mmHg) SpO2 O2 Device Patient Position - Orthostatic VS Pain    06/19/25 0830 -- -- -- -- -- -- -- -- 8 06/19/25 0829 -- -- -- -- -- -- -- -- 8 06/19/25 0727 97.2 °F (36.2 °C) 78 19 137/75 100 95 % None (Room air) Lying --    06/19/25 0617 -- -- -- -- -- -- -- -- 8 06/19/25 0430 97.5 °F (36.4 °C) 87 27 141/63 90 94 % -- -- --    06/19/25 0407 -- -- -- -- -- -- -- -- 7    06/19/25 0240 -- -- -- -- -- -- -- -- 8 06/19/25 0000 96.8 °F (36 °C) -- -- -- -- -- -- -- --    06/18/25 2204 -- -- -- -- -- -- -- -- 8 06/18/25 2103  -- -- -- -- -- -- -- -- 8    06/18/25 2000 -- -- -- -- -- -- -- -- 8    06/18/25 1921 98 °F (36.7 °C) -- -- 126/90 -- -- -- Sitting --    06/18/25 1900 -- 82 23 -- -- 96 % -- -- --    06/18/25 1814 -- 76 34 -- -- 97 % -- -- 9    06/18/25 1800 -- 80 22 -- -- 95 % -- -- --    06/18/25 1700 -- 76 18 -- -- 92 % -- -- --    06/18/25 1600 -- 79 21 -- -- 94 % -- -- --    06/18/25 1500 98.5 °F (36.9 °C) 87 23 125/71 93 95 % None (Room air) Lying --    06/18/25 1413 -- -- -- -- -- -- -- -- 10 - Worst Possible Pain    06/18/25 1304 -- -- -- -- -- -- -- -- 9    06/18/25 1115 -- -- -- -- -- -- -- -- 8    06/18/25 1100 98.6 °F (37 °C) -- -- 137/83 104 -- None (Room air) Lying --    06/18/25 1013 -- -- -- -- -- -- None (Room air) -- --    06/18/25 1000 -- 108 24 -- -- 97 % -- -- --    06/18/25 0903 98.1 °F (36.7 °C) 116 29 -- -- -- -- -- --    06/18/25 0825 -- 98 29 136/88 109 96 % -- -- --    06/18/25 0820 -- -- -- -- -- -- -- -- 10 - Worst Possible Pain    06/18/25 0716 -- -- -- -- -- -- -- -- 10 - Worst Possible Pain    06/18/25 0715 -- 105 16 156/97 119 92 % -- -- --    06/18/25 0657 -- 102 -- -- -- -- -- -- --    06/18/25 0542 -- -- -- -- -- -- -- -- 10 - Worst Possible Pain    06/18/25 0506 98.3 °F (36.8 °C) 110 20 156/89 -- 94 % -- -- 10 - Worst Possible Pain              Pertinent Labs/Diagnostic Test Results:   Radiology:  US scrotum and testicles   Final Interpretation by Delta Joseph MD (06/18 0630)      Normal exam.      Workstation performed: [id number]         CT abdomen pelvis wo contrast   Final Interpretation by Jake Abreu MD (06/18 0546)      No acute findings in the abdomen or pelvis within the limits of unenhanced technique.      Hepatomegaly with steatosis.      Workstation performed: [id number]           Results from last 7 days   Lab Units 06/19/25  0738 06/19/25  0605 06/18/25  0523   WBC Thousand/uL 10.17* 10.46* 16.22*   HEMOGLOBIN g/dL 12.6 12.5 16.3   HEMATOCRIT % 36.5 37.5 47.0    PLATELETS Thousands/uL 219 220 360   TOTAL NEUT ABS Thousands/µL 6.41 6.39 12.60*         Results from last 7 days   Lab Units 06/19/25  0738 06/19/25  0605 06/19/25  0441 06/18/25  1623 06/18/25  1237 06/18/25  0920   SODIUM mmol/L 135 133*  --  131* 133* 133*   POTASSIUM mmol/L 3.4* 3.7  --  3.8 3.6 4.1   CHLORIDE mmol/L 106 104  --  102 104 104   CO2 mmol/L 22 22  --  18* 17* 14*   ANION GAP mmol/L 7 7  --  11 12 15*   BUN mg/dL 14 14  --  11 12 12   CREATININE mg/dL 0.91 0.95  --  0.98 1.02 1.06   EGFR ml/min/1.73sq m 104 99  --  95 90 86   CALCIUM mg/dL 8.7 8.3*  --  8.3* 8.9 8.9   CALCIUM, IONIZED mmol/L  --   --  0.99*  --   --   --    MAGNESIUM mg/dL 2.1 2.1  --  1.8* 1.9 2.0   PHOSPHORUS mg/dL 2.8 3.1  --  3.3 2.3* 2.6*     Results from last 7 days   Lab Units 06/19/25  0738 06/19/25  0605   AST U/L 62* 63*   ALT U/L 49 48   ALK PHOS U/L 102 104   TOTAL PROTEIN g/dL 6.0* 5.9*   ALBUMIN g/dL 3.7 3.7   TOTAL BILIRUBIN mg/dL 0.39 0.40     Results from last 7 days   Lab Units 06/19/25  0808 06/19/25  0606 06/19/25  0425 06/19/25  0404 06/19/25  0140 06/18/25  2348 06/18/25  2159 06/18/25  2009 06/18/25  1805 06/18/25  1708 06/18/25  1618 06/18/25  1504   POC GLUCOSE mg/dl 162* 240* 91 67 222* 209* 195* 190* 203* 267* 215* 201*     Results from last 7 days   Lab Units 06/19/25  0738 06/19/25  0605 06/18/25  1623 06/18/25  1237 06/18/25  0920 06/18/25  0523   GLUCOSE RANDOM mg/dL 170* 250* 243* 81 190* 371*     Results from last 7 days   Lab Units 06/18/25  0523   HEMOGLOBIN A1C % 11.6*   EAG mg/dl 286     Beta- Hydroxybutyrate   Date Value Ref Range Status   06/18/2025 4.71 (H) 0.20 - 0.60 mmol/L Final          Results from last 7 days   Lab Units 06/18/25  0601   PH CECILLE  7.283*   PCO2 CECILLE mm Hg 28.8*   PO2 CECILLE mm Hg 107.9*   HCO3 CECILLE mmol/L 13.3*   BASE EXC CECILLE mmol/L -11.6   O2 CONTENT CECILLE ml/dL 22.7   O2 HGB, VENOUS % 95.8*     Results from last 7 days   Lab Units 06/18/25  0523   PROCALCITONIN ng/ml 0.11      Results from last 7 days   Lab Units 06/18/25  0601   LACTIC ACID mmol/L 1.9     Results from last 7 days   Lab Units 06/18/25  0920   LIPASE u/L 74     Results from last 7 days   Lab Units 06/18/25  0554   CLARITY UA  Clear   COLOR UA  Yellow   SPEC GRAV UA  >=1.030*   PH UA  6.0   GLUCOSE UA mg/dl 3+*   KETONES UA mg/dl 80 (3+)*   BLOOD UA  2+*   PROTEIN UA mg/dl 2+*   NITRITE UA  Negative   BILIRUBIN UA  1+*   UROBILINOGEN UA E.U./dl 0.2   LEUKOCYTES UA  Negative   WBC UA /hpf None Seen   RBC UA /hpf 0-1   BACTERIA UA /hpf None Seen   EPITHELIAL CELLS WET PREP /hpf None Seen     Results from last 7 days   Lab Units 06/18/25  0920 06/18/25  0919   BLOOD CULTURE  Received in Microbiology Lab. Culture in Progress. Received in Microbiology Lab. Culture in Progress.         Past Medical History[1]  Present on Admission:   Metabolic acidosis   DKA (diabetic ketoacidosis) ([facility])   Cocaine use   SIRS (systemic inflammatory response syndrome) ([facility])   HTN (hypertension)   Factor 5 Leiden mutation, heterozygous (HCC)   Flank pain   Obesity   Hypertriglyceridemia      Admitting Diagnosis: Abdominal pain [R10.9]  DKA (diabetic ketoacidosis) ([facility]) [E11.10]  Age/Sex: 39 y.o. male    Network Utilization Review Department  ATTENTION: Please call with any questions or concerns to [phone] and carefully listen to the prompts so that you are directed to the right person. All voicemails are confidential.   For Discharge needs, contact Care Management DC Support Team at [phone] opt. 2  Send all requests for admission clinical reviews, approved or denied determinations and any other requests to dedicated fax number below belonging to the campus where the patient is receiving treatment. List of dedicated fax numbers for the Facilities:  FACILITY NAME UR FAX NUMBER   ADMISSION DENIALS (Administrative/Medical Necessity) [phone]   DISCHARGE SUPPORT TEAM (NETWORK) [phone]   [facility] CHILD HEALTH  (Maternity/NICU/Pediatrics) [phone]   [facility] [phone]   [facility] [phone]   [facility] [phone]   [facility] [phone]   [facility] [phone]   [facility] [phone]   [facility] [phone]   [facility] [phone]   [facility] [phone]   [facility] [phone]   [facility] [phone]   [facility] [phone]              [1]   Past Medical History:  Diagnosis Date    Anxiety     Bipolar affective disorder (HCC)     Hypertension

## 2025-06-19 NOTE — ASSESSMENT & PLAN NOTE
TRIG 1249  Likely diet-related  Consider starting Tricor and statin   Low cholesterol/triglyceride diet

## 2025-06-19 NOTE — ASSESSMENT & PLAN NOTE
Lab Results   Component Value Date    HGBA1C 11.6 (H) 06/18/2025       Recent Labs     06/18/25  2348 06/19/25  0140 06/19/25  0404 06/19/25  0425   POCGLU 209* 222* 67 91       Blood Sugar Average: Last 72 hrs:  (P) [id number]    POA AEB glucose 371, BHB 4.71, AG 20 w/bicarb 13, VBG pH 7.283  Appears to be new onset - reports polydipsia, polyphagia, and polydipsia as OP  Is s/p aggressive volume resuscitaiton  DKA insulin gtt transitioned to non-DKA insulin gtt - will need to be transitioned to SQ insulin regimen   Endocrinology consulted given new onset and will require OP follow-up  Nutrition consulted for diabetes education  Diabetic diet

## 2025-06-19 NOTE — ASSESSMENT & PLAN NOTE
Presented w/R-sided flank pain that radiates to scrotum  Possibly in setting of kidney stone?  CT A/P w/out contrast w/no acute findings  Scrotum US WNL  UA w/2+ blood  Is s/p aggressive volume resuscitation   Continue scheduled APAP/Toradol and PRN Oxycodone/Dilaudid  Strain all urine

## 2025-06-19 NOTE — ASSESSMENT & PLAN NOTE
Lab Results   Component Value Date    HGBA1C 11.6 (H) 06/18/2025       Recent Labs     06/19/25  1600 06/19/25  2115 06/20/25  0726 06/20/25  1115   POCGLU 226* 218* 281* 179*     Resolved     Blood Sugar Average: Last 72 hrs:  (P) 189.32

## 2025-06-19 NOTE — CONSULTS
"Consult received for diabetes education. Reviewed/provided \"Carbohydrate Counting for Patients with Diabetes\" and \"Plate Method for Diabetes\" handout. Provided outpatient MNT pamphlet and RD contact card. Encouraged outpatient visit.   "

## 2025-06-19 NOTE — ASSESSMENT & PLAN NOTE
Has hx of unprovoked DVT/PE in 2019 for which he was started on Eliquis, however it does not appear he has continued to take this  Will need to discuss resuming AC w/patient today - will continue DVT ppx for now

## 2025-06-20 VITALS
TEMPERATURE: 98.3 F | OXYGEN SATURATION: 97 % | RESPIRATION RATE: 20 BRPM | HEIGHT: 72 IN | HEART RATE: 95 BPM | DIASTOLIC BLOOD PRESSURE: 79 MMHG | WEIGHT: 275.57 LBS | SYSTOLIC BLOOD PRESSURE: 136 MMHG | BODY MASS INDEX: 37.33 KG/M2

## 2025-06-20 PROBLEM — E11.9 NEWLY DIAGNOSED DIABETES (HCC): Status: ACTIVE | Noted: 2025-06-20

## 2025-06-20 LAB
ANION GAP SERPL CALCULATED.3IONS-SCNC: 8 MMOL/L (ref 4–13)
BUN SERPL-MCNC: 6 MG/DL (ref 5–25)
CA-I BLD-SCNC: 1.07 MMOL/L (ref 1.12–1.32)
CALCIUM SERPL-MCNC: 8.9 MG/DL (ref 8.4–10.2)
CHLORIDE SERPL-SCNC: 103 MMOL/L (ref 96–108)
CO2 SERPL-SCNC: 25 MMOL/L (ref 21–32)
CREAT SERPL-MCNC: 0.75 MG/DL (ref 0.6–1.3)
ERYTHROCYTE [DISTWIDTH] IN BLOOD BY AUTOMATED COUNT: 11.4 % (ref 11.6–15.1)
GFR SERPL CREATININE-BSD FRML MDRD: 113 ML/MIN/1.73SQ M
GLUCOSE SERPL-MCNC: 151 MG/DL (ref 65–140)
GLUCOSE SERPL-MCNC: 179 MG/DL (ref 65–140)
GLUCOSE SERPL-MCNC: 252 MG/DL (ref 65–140)
GLUCOSE SERPL-MCNC: 281 MG/DL (ref 65–140)
HCT VFR BLD AUTO: 39.2 % (ref 36.5–49.3)
HGB BLD-MCNC: 13.7 G/DL (ref 12–17)
MAGNESIUM SERPL-MCNC: 1.8 MG/DL (ref 1.9–2.7)
MCH RBC QN AUTO: 31.4 PG (ref 26.8–34.3)
MCHC RBC AUTO-ENTMCNC: 34.9 G/DL (ref 31.4–37.4)
MCV RBC AUTO: 90 FL (ref 82–98)
PHOSPHATE SERPL-MCNC: 2.6 MG/DL (ref 2.7–4.5)
PLATELET # BLD AUTO: 224 THOUSANDS/UL (ref 149–390)
PMV BLD AUTO: 10.7 FL (ref 8.9–12.7)
POTASSIUM SERPL-SCNC: 3.7 MMOL/L (ref 3.5–5.3)
RBC # BLD AUTO: 4.36 MILLION/UL (ref 3.88–5.62)
SODIUM SERPL-SCNC: 136 MMOL/L (ref 135–147)
WBC # BLD AUTO: 10.05 THOUSAND/UL (ref 4.31–10.16)

## 2025-06-20 PROCEDURE — 83735 ASSAY OF MAGNESIUM: CPT | Performed by: NURSE PRACTITIONER

## 2025-06-20 PROCEDURE — 99232 SBSQ HOSP IP/OBS MODERATE 35: CPT | Performed by: STUDENT IN AN ORGANIZED HEALTH CARE EDUCATION/TRAINING PROGRAM

## 2025-06-20 PROCEDURE — 82330 ASSAY OF CALCIUM: CPT | Performed by: NURSE PRACTITIONER

## 2025-06-20 PROCEDURE — 84100 ASSAY OF PHOSPHORUS: CPT | Performed by: NURSE PRACTITIONER

## 2025-06-20 PROCEDURE — 80048 BASIC METABOLIC PNL TOTAL CA: CPT | Performed by: NURSE PRACTITIONER

## 2025-06-20 PROCEDURE — 85027 COMPLETE CBC AUTOMATED: CPT | Performed by: NURSE PRACTITIONER

## 2025-06-20 PROCEDURE — 99238 HOSP IP/OBS DSCHRG MGMT 30/<: CPT | Performed by: PHYSICIAN ASSISTANT

## 2025-06-20 PROCEDURE — 82948 REAGENT STRIP/BLOOD GLUCOSE: CPT

## 2025-06-20 RX ORDER — PANTOPRAZOLE SODIUM 40 MG/1
40 TABLET, DELAYED RELEASE ORAL
Qty: 30 TABLET | Refills: 0 | Status: SHIPPED | OUTPATIENT
Start: 2025-06-21

## 2025-06-20 RX ORDER — INSULIN GLARGINE 100 [IU]/ML
36 INJECTION, SOLUTION SUBCUTANEOUS DAILY
Qty: 10.8 ML | Refills: 0 | Status: SHIPPED | OUTPATIENT
Start: 2025-06-20

## 2025-06-20 RX ORDER — BLOOD SUGAR DIAGNOSTIC
STRIP MISCELLANEOUS
Qty: 200 EACH | Refills: 0 | Status: SHIPPED | OUTPATIENT
Start: 2025-06-20 | End: 2025-07-02 | Stop reason: SDUPTHER

## 2025-06-20 RX ORDER — MAGNESIUM SULFATE HEPTAHYDRATE 40 MG/ML
2 INJECTION, SOLUTION INTRAVENOUS ONCE
Status: COMPLETED | OUTPATIENT
Start: 2025-06-20 | End: 2025-06-20

## 2025-06-20 RX ORDER — METOPROLOL SUCCINATE 25 MG/1
25 TABLET, EXTENDED RELEASE ORAL DAILY
Qty: 30 TABLET | Refills: 0 | Status: SHIPPED | OUTPATIENT
Start: 2025-06-20 | End: 2025-07-02 | Stop reason: SDUPTHER

## 2025-06-20 RX ORDER — INSULIN LISPRO 100 [IU]/ML
10 INJECTION, SOLUTION SUBCUTANEOUS
Qty: 15 ML | Refills: 0 | Status: SHIPPED | OUTPATIENT
Start: 2025-06-20 | End: 2025-06-20

## 2025-06-20 RX ORDER — PEN NEEDLE, DIABETIC 32GX 5/32"
NEEDLE, DISPOSABLE MISCELLANEOUS
Qty: 100 EACH | Refills: 0 | Status: SHIPPED | OUTPATIENT
Start: 2025-06-20

## 2025-06-20 RX ORDER — ATORVASTATIN CALCIUM 40 MG/1
40 TABLET, FILM COATED ORAL
Qty: 30 TABLET | Refills: 0 | Status: SHIPPED | OUTPATIENT
Start: 2025-06-20 | End: 2025-07-02 | Stop reason: SDUPTHER

## 2025-06-20 RX ORDER — INSULIN DEGLUDEC 100 U/ML
36 INJECTION, SOLUTION SUBCUTANEOUS DAILY
Qty: 15 ML | Refills: 0 | Status: SHIPPED | OUTPATIENT
Start: 2025-06-20 | End: 2025-06-20

## 2025-06-20 RX ORDER — BLOOD-GLUCOSE METER
KIT MISCELLANEOUS
Qty: 1 KIT | Refills: 0 | Status: SHIPPED | OUTPATIENT
Start: 2025-06-20

## 2025-06-20 RX ORDER — INSULIN LISPRO 100 [IU]/ML
12 INJECTION, SOLUTION INTRAVENOUS; SUBCUTANEOUS
Status: DISCONTINUED | OUTPATIENT
Start: 2025-06-20 | End: 2025-06-20 | Stop reason: HOSPADM

## 2025-06-20 RX ORDER — FENOFIBRATE 145 MG/1
145 TABLET, FILM COATED ORAL DAILY
Qty: 30 TABLET | Refills: 0 | Status: SHIPPED | OUTPATIENT
Start: 2025-06-21 | End: 2025-07-02 | Stop reason: SDUPTHER

## 2025-06-20 RX ORDER — GLUCOSAMINE HCL/CHONDROITIN SU 500-400 MG
CAPSULE ORAL
Qty: 200 EACH | Refills: 0 | Status: SHIPPED | OUTPATIENT
Start: 2025-06-20

## 2025-06-20 RX ORDER — INSULIN ASPART 100 [IU]/ML
10 INJECTION, SOLUTION INTRAVENOUS; SUBCUTANEOUS
Qty: 15 ML | Refills: 0 | Status: SHIPPED | OUTPATIENT
Start: 2025-06-20

## 2025-06-20 RX ORDER — LISINOPRIL 5 MG/1
5 TABLET ORAL DAILY
Qty: 30 TABLET | Refills: 0 | Status: SHIPPED | OUTPATIENT
Start: 2025-06-20 | End: 2025-07-02 | Stop reason: SDUPTHER

## 2025-06-20 RX ORDER — INSULIN LISPRO 100 [IU]/ML
10 INJECTION, SOLUTION INTRAVENOUS; SUBCUTANEOUS
Qty: 15 ML | Refills: 0 | Status: SHIPPED | OUTPATIENT
Start: 2025-06-20 | End: 2025-06-20

## 2025-06-20 RX ADMIN — POTASSIUM & SODIUM PHOSPHATES POWDER PACK 280-160-250 MG 2 PACKET: 280-160-250 PACK at 08:17

## 2025-06-20 RX ADMIN — MAGNESIUM SULFATE HEPTAHYDRATE 2 G: 40 INJECTION, SOLUTION INTRAVENOUS at 05:01

## 2025-06-20 RX ADMIN — ACETAMINOPHEN 650 MG: 325 TABLET ORAL at 03:40

## 2025-06-20 RX ADMIN — INSULIN LISPRO 2 UNITS: 100 INJECTION, SOLUTION INTRAVENOUS; SUBCUTANEOUS at 12:03

## 2025-06-20 RX ADMIN — FENOFIBRATE 145 MG: 145 TABLET, FILM COATED ORAL at 08:17

## 2025-06-20 RX ADMIN — APIXABAN 5 MG: 5 TABLET, FILM COATED ORAL at 08:17

## 2025-06-20 RX ADMIN — INSULIN GLARGINE 36 UNITS: 100 INJECTION, SOLUTION SUBCUTANEOUS at 08:16

## 2025-06-20 RX ADMIN — INSULIN LISPRO 10 UNITS: 100 INJECTION, SOLUTION INTRAVENOUS; SUBCUTANEOUS at 12:03

## 2025-06-20 RX ADMIN — INSULIN LISPRO 6 UNITS: 100 INJECTION, SOLUTION INTRAVENOUS; SUBCUTANEOUS at 08:15

## 2025-06-20 RX ADMIN — INSULIN LISPRO 10 UNITS: 100 INJECTION, SOLUTION INTRAVENOUS; SUBCUTANEOUS at 08:16

## 2025-06-20 RX ADMIN — ACETAMINOPHEN 650 MG: 325 TABLET ORAL at 12:01

## 2025-06-20 RX ADMIN — PANTOPRAZOLE SODIUM 40 MG: 40 TABLET, DELAYED RELEASE ORAL at 05:00

## 2025-06-20 RX ADMIN — CYCLOBENZAPRINE HYDROCHLORIDE 10 MG: 10 TABLET, FILM COATED ORAL at 08:23

## 2025-06-20 NOTE — PROGRESS NOTES
Administrative Statements   VIRTUAL CARE DOCUMENTATION:     1. This service was provided via Telemedicine using [employer]     2. Parties in the room with patient during teleconsult Patient only    3. Confidentiality My office door was closed     4. Participants No one else was in the room    5. Patient acknowledged consent and understanding of privacy and security of the  Telemedicine consult. I informed the patient that I have reviewed their record in Epic and presented the opportunity for them to ask any questions regarding the visit today.  The patient agreed to participate.       ;l  Progress Note - Endocrinology   Name: Shun Linda 39 y.o. male I MRN: [id number]  Unit/Bed#: ICU 10-01 I Date of Admission: 6/18/2025   Date of Service: 6/20/2025 I Hospital Day: 2     Assessment & Plan  DKA (diabetic ketoacidosis) (HCC)  Lab Results   Component Value Date    HGBA1C 11.6 (H) 06/18/2025       Recent Labs     06/19/25  1600 06/19/25  2115 06/20/25  0726 06/20/25  1115   POCGLU 226* 218* 281* 179*     Resolved     Blood Sugar Average: Last 72 hrs:  (P) 189.32             Newly diagnosed diabetes (HCC)  Lab Results   Component Value Date    HGBA1C 11.6 (H) 06/18/2025       Recent Labs     06/19/25  1600 06/19/25  2115 06/20/25  0726 06/20/25  1115   POCGLU 226* 218* 281* 179*     No known history of diabetes, last A1c in 2019 was 5.6, he reports hyperglycemia symptoms prior to his admission.  Presented with diabetic ketoacidosis, which currently resolved currently on Lantus to 36 units every morning and Humalog 10 units 3 times daily AC and correctional insulin,  Blood sugars remains above goal which is 140-180.  I recommend to continue Lantus at current dose, and increase Humalog to 12 units 3 times daily AC.  Continue correctional insulin with algorithm #4 during the day and #2 at bedtime.  Continue to monitor blood sugar over time and make adjustments to the regimen if necessary.  Endocrinology outpatient  follow-up in 4 weeks.   For discharge, if Lantus solostar is not the preferred basal insulin for the patient's insurance company, please substitute with Tresiba flexpen, Basaglar Kwikpen , Levemir flexpen, Toujeo solostar pen  or equivalent insulin vials at the same dose instead.      For discharge, if Humalog  Kwik pen is not the preferred mealtime insulin for the patient's insurance, please substitute with NovoLog flexpen, Fiasp  flextouch, Admelog solostar  or Apidra solostar pen or equivalent insulin vials at the same dose instead.     If basal/bolus therapy is not covered or affordable, please substitute with Novolin 70/30 Flexpen OR Novolin 70/30 vial at equivalent  dose     Please prescribe insulin pen needles, glucometer, test strips, lancets and insulin syringes (only when using insulin vials) on discharge.        CC: diabetes f/u    Subjective:   Shun Linda is a 39 y.o. year old male with type 2 diabetes.  Feels well.  No complaints.  No hypoglycemia.    Objective:     Vitals: Blood pressure 136/79, pulse 95, temperature 98.3 °F (36.8 °C), temperature source Temporal, resp. rate 20, height 6' (1.829 m), weight 125 kg (275 lb 9.2 oz), SpO2 97%.,Body mass index is 37.37 kg/m².      Intake/Output Summary (Last 24 hours) at 6/20/2025 1452  Last data filed at 6/20/2025 0601  Gross per 24 hour   Intake 250 ml   Output --   Net 250 ml       Physical Exam:  General Appearance: awake, appears stated age and cooperative  Head: Normocephalic, without obvious abnormality, atraumatic  Extremities: moves all extremities  Skin: Skin color and temperature normal.   Pulm: no labored breathing    Lab, Imaging and other studies: Results Review Statement: No pertinent imaging studies reviewed.    POC Glucose (mg/dl)   Date Value   06/20/2025 179 (H)   06/20/2025 281 (H)   06/19/2025 218 (H)   06/19/2025 226 (H)   06/19/2025 186 (H)   06/19/2025 162 (H)   06/19/2025 240 (H)   06/19/2025 91   06/19/2025 67   06/19/2025  222 (H)

## 2025-06-20 NOTE — ASSESSMENT & PLAN NOTE
Previously on Lisinopril and Lopressor, however has not been compliant w/these  Outpatient follow-up with PCP

## 2025-06-20 NOTE — ASSESSMENT & PLAN NOTE
Lab Results   Component Value Date    HGBA1C 11.6 (H) 06/18/2025       Recent Labs     06/19/25  1600 06/19/25  2115 06/20/25  0726 06/20/25  1115   POCGLU 226* 218* 281* 179*       Blood Sugar Average: Last 72 hrs:  (P) 189.32    POA AEB glucose 371, BHB 4.71, AG 20 w/bicarb 13, VBG pH 7.283  Appears to be new onset - reports polydipsia, polyphagia, and polydipsia as OP  Is s/p aggressive volume resuscitaiton  DKA insulin gtt transitioned to non-DKA insulin gtt,  transitioned to SQ insulin regimen 6/19  Endocrinology consultation appreciated  Patient discharged on Lantus 36 units every morning, NovoLog 10 units 3 times daily with meals  Outpatient follow-up in 4 weeks  Nutrition consulted for diabetes education  Diabetic diet

## 2025-06-20 NOTE — NURSING NOTE
"Patient was reinforced with continual diabetes education.  Patient received physical packets of diabetes education as well.  Prescriptions called in for patient to pharmacy and patient was informed of Lantus dose being available tomorrow for  at noon.  Patient refused current dose of insulin for meal coverage, stated he \"will take it when he gets home and eats\".  Patient was informed of upcoming appointments and the importance of attending appointments for the management of his diabetes and health conditions.  All belongings sent with patient, no questions at this time.     "

## 2025-06-20 NOTE — ASSESSMENT & PLAN NOTE
Has hx of unprovoked DVT/PE in 2019 for which he was started on Eliquis, however it does not appear he has continued to take this  Will need to discuss resuming AC w/patient   Patient restarted on Eliquis on 6/19/2025, will continue on discharge   Terbinafine Counseling: Patient counseling regarding adverse effects of terbinafine including but not limited to headache, diarrhea, rash, upset stomach, liver function test abnormalities, itching, taste/smell disturbance, nausea, abdominal pain, and flatulence.  There is a rare possibility of liver failure that can occur when taking terbinafine.  The patient understands that a baseline LFT and kidney function test may be required. The patient verbalized understanding of the proper use and possible adverse effects of terbinafine.  All of the patient's questions and concerns were addressed.

## 2025-06-20 NOTE — ASSESSMENT & PLAN NOTE
TRIG 1249  Likely diet-related  Patient was started on Tricor and statin, this was continued on discharge  Low cholesterol/triglyceride diet

## 2025-06-20 NOTE — PLAN OF CARE
Problem: Potential for Falls  Goal: Patient will remain free of falls  Description: INTERVENTIONS:  - Educate patient/family on patient safety including physical limitations  - Instruct patient to call for assistance with activity   - Consider consulting OT/PT to assist with strengthening/mobility based on AM PAC & JH-HLM score  - Consult OT/PT to assist with strengthening/mobility   - Keep Call bell within reach  - Keep bed low and locked with side rails adjusted as appropriate  - Keep care items and personal belongings within reach  - Initiate and maintain comfort rounds  - Make Fall Risk Sign visible to staff  - Offer Toileting every 2 Hours, in advance of need  - Initiate/Maintain bed alarm  - Obtain necessary fall risk management equipment  - Apply yellow socks and bracelet for high fall risk patients  - Consider moving patient to room near nurses station  Outcome: Progressing     Problem: PAIN - ADULT  Goal: Verbalizes/displays adequate comfort level or baseline comfort level  Description: Interventions:  - Encourage patient to monitor pain and request assistance  - Assess pain using appropriate pain scale  - Administer analgesics as ordered based on type and severity of pain and evaluate response  - Implement non-pharmacological measures as appropriate and evaluate response  - Consider cultural and social influences on pain and pain management  - Notify physician/advanced practitioner if interventions unsuccessful or patient reports new pain  - Educate patient/family on pain management process including their role and importance of  reporting pain   - Provide non-pharmacologic/complimentary pain relief interventions  Outcome: Progressing     Problem: INFECTION - ADULT  Goal: Absence or prevention of progression during hospitalization  Description: INTERVENTIONS:  - Assess and monitor for signs and symptoms of infection  - Monitor lab/diagnostic results  - Monitor all insertion sites, i.e. indwelling lines,  tubes, and drains  - Monitor endotracheal if appropriate and nasal secretions for changes in amount and color  - [place] appropriate cooling/warming therapies per order  - Administer medications as ordered  - Instruct and encourage patient and family to use good hand hygiene technique  - Identify and instruct in appropriate isolation precautions for identified infection/condition  Outcome: Progressing     Problem: SAFETY ADULT  Goal: Patient will remain free of falls  Description: INTERVENTIONS:  - Educate patient/family on patient safety including physical limitations  - Instruct patient to call for assistance with activity   - Consider consulting OT/PT to assist with strengthening/mobility based on AM PAC & -HLM score  - Consult OT/PT to assist with strengthening/mobility   - Keep Call bell within reach  - Keep bed low and locked with side rails adjusted as appropriate  - Keep care items and personal belongings within reach  - Initiate and maintain comfort rounds  - Make Fall Risk Sign visible to staff  - Offer Toileting every 2 Hours, in advance of need  - Initiate/Maintain bed alarm  - Obtain necessary fall risk management equipment  - Apply yellow socks and bracelet for high fall risk patients  - Consider moving patient to room near nurses station  Outcome: Progressing  Goal: Maintain or return to baseline ADL function  Description: INTERVENTIONS:  -  Assess patient's ability to carry out ADLs; assess patient's baseline for ADL function and identify physical deficits which impact ability to perform ADLs (bathing, care of mouth/teeth, toileting, grooming, dressing, etc.)  - Assess/evaluate cause of self-care deficits   - Assess range of motion  - Assess patient's mobility; develop plan if impaired  - Assess patient's need for assistive devices and provide as appropriate  - Encourage maximum independence but intervene and supervise when necessary  - Involve family in performance of ADLs  - Assess for home  care needs following discharge   - Consider OT consult to assist with ADL evaluation and planning for discharge  - Provide patient education as appropriate  - Monitor functional capacity and physical performance, use of AM PAC & JH-HLM   - Monitor gait, balance and fatigue with ambulation    Outcome: Progressing  Goal: Maintains/Returns to pre admission functional level  Description: INTERVENTIONS:  - Perform AM-PAC 6 Click Basic Mobility/ Daily Activity assessment daily.  - Set and communicate daily mobility goal to care team and patient/family/caregiver.   - Collaborate with rehabilitation services on mobility goals if consulted  - Perform Range of Motion 3 times a day.  - Reposition patient every 2 hours.  - Dangle patient 3 times a day  - Stand patient 3 times a day  - Ambulate patient 3 times a day  - Out of bed to chair 3 times a day   - Out of bed for meals 3 times a day  - Out of bed for toileting  - Record patient progress and toleration of activity level   Outcome: Progressing     Problem: DISCHARGE PLANNING  Goal: Discharge to home or other facility with appropriate resources  Description: INTERVENTIONS:  - Identify barriers to discharge w/patient and caregiver  - Arrange for needed discharge resources and transportation as appropriate  - Identify discharge learning needs (meds, wound care, etc.)  - Arrange for interpretive services to assist at discharge as needed  - Refer to Case Management Department for coordinating discharge planning if the patient needs post-hospital services based on physician/advanced practitioner order or complex needs related to functional status, cognitive ability, or social support system  Outcome: Progressing     Problem: Knowledge Deficit  Goal: Patient/family/caregiver demonstrates understanding of disease process, treatment plan, medications, and discharge instructions  Description: Complete learning assessment and assess knowledge base.  Interventions:  - Provide teaching  at level of understanding  - Provide teaching via preferred learning methods  Outcome: Progressing     Problem: GASTROINTESTINAL - ADULT  Goal: Minimal or absence of nausea and/or vomiting  Description: INTERVENTIONS:  - Administer IV fluids if ordered to ensure adequate hydration  - Maintain NPO status until nausea and vomiting are resolved  - Nasogastric tube if ordered  - Administer ordered antiemetic medications as needed  - Provide nonpharmacologic comfort measures as appropriate  - Advance diet as tolerated, if ordered  - Consider nutrition services referral to assist patient with adequate nutrition and appropriate food choices  Outcome: Progressing     Problem: METABOLIC, FLUID AND ELECTROLYTES - ADULT  Goal: Electrolytes maintained within normal limits  Description: INTERVENTIONS:  - Monitor labs and assess patient for signs and symptoms of electrolyte imbalances  - Administer electrolyte replacement as ordered  - Monitor response to electrolyte replacements, including repeat lab results as appropriate  - Instruct patient on fluid and nutrition as appropriate  Outcome: Progressing  Goal: Glucose maintained within target range  Description: INTERVENTIONS:  - Monitor Blood Glucose as ordered  - Assess for signs and symptoms of hyperglycemia and hypoglycemia  - Administer ordered medications to maintain glucose within target range  - Assess nutritional intake and initiate nutrition service referral as needed  Outcome: Progressing     Problem: MUSCULOSKELETAL - ADULT  Goal: Maintain or return mobility to safest level of function  Description: INTERVENTIONS:  - Assess patient's ability to carry out ADLs; assess patient's baseline for ADL function and identify physical deficits which impact ability to perform ADLs (bathing, care of mouth/teeth, toileting, grooming, dressing, etc.)  - Assess/evaluate cause of self-care deficits   - Assess range of motion  - Assess patient's mobility  - Assess patient's need for  assistive devices and provide as appropriate  - Encourage maximum independence but intervene and supervise when necessary  - Involve family in performance of ADLs  - Assess for home care needs following discharge   - Consider OT consult to assist with ADL evaluation and planning for discharge  - Provide patient education as appropriate  Outcome: Progressing     Problem: Learning/Teaching Needs - Diabetes  Goal: Patient demonstrates adequate knowledge of diabetes monitoring and treatment  Description: - Patient/caregiver will demonstrate adequate knowledge of diabetes as evidenced by the ability to perform blood sugar monitoring; care of feet, skin, and eyes; signs/symptoms of altered glycemic status states; and their treatment within   Outcome: Progressing      - feeds ad gaurav  - DS per protocol

## 2025-06-20 NOTE — ASSESSMENT & PLAN NOTE
Lab Results   Component Value Date    HGBA1C 11.6 (H) 06/18/2025       Recent Labs     06/19/25  1600 06/19/25  2115 06/20/25  0726 06/20/25  1115   POCGLU 226* 218* 281* 179*     No known history of diabetes, last A1c in 2019 was 5.6, he reports hyperglycemia symptoms prior to his admission.  Presented with diabetic ketoacidosis, which currently resolved currently on Lantus to 36 units every morning and Humalog 10 units 3 times daily AC and correctional insulin,  Blood sugars remains above goal which is 140-180.  I recommend to continue Lantus at current dose, and increase Humalog to 12 units 3 times daily AC.  Continue correctional insulin with algorithm #4 during the day and #2 at bedtime.  Continue to monitor blood sugar over time and make adjustments to the regimen if necessary.  Endocrinology outpatient follow-up in 4 weeks.   For discharge, if Lantus solostar is not the preferred basal insulin for the patient's insurance company, please substitute with Tresiba flexpen, Basaglar Kwikpen , Levemir flexpen, Toujeo solostar pen  or equivalent insulin vials at the same dose instead.      For discharge, if Humalog  Kwik pen is not the preferred mealtime insulin for the patient's insurance, please substitute with NovoLog flexpen, Fiasp  flextouch, Admelog solostar  or Apidra solostar pen or equivalent insulin vials at the same dose instead.     If basal/bolus therapy is not covered or affordable, please substitute with Novolin 70/30 Flexpen OR Novolin 70/30 vial at equivalent  dose     Please prescribe insulin pen needles, glucometer, test strips, lancets and insulin syringes (only when using insulin vials) on discharge.

## 2025-06-20 NOTE — ASSESSMENT & PLAN NOTE
Presented w/R-sided flank pain that radiates to scrotum  CT A/P w/out contrast w/no acute findings  Scrotum US WNL  UA w/2+ blood  Is s/p aggressive volume resuscitation   Continue scheduled APAP/Toradol and PRN Oxycodone/Dilaudid  CT renal stone study negative  Pain now resolved

## 2025-06-20 NOTE — DISCHARGE SUMMARY
Discharge Summary - Hospitalist   Name: Shun Linda 39 y.o. male I MRN: [id number]  Unit/Bed#: ICU 10-01 I Date of Admission: 6/18/2025   Date of Service: 6/20/2025 I Hospital Day: 2     Assessment & Plan  DKA (diabetic ketoacidosis) (HCC)  Lab Results   Component Value Date    HGBA1C 11.6 (H) 06/18/2025       Recent Labs     06/19/25  1600 06/19/25  2115 06/20/25  0726 06/20/25  1115   POCGLU 226* 218* 281* 179*       Blood Sugar Average: Last 72 hrs:  (P) 189.32    POA AEB glucose 371, BHB 4.71, AG 20 w/bicarb 13, VBG pH 7.283  Appears to be new onset - reports polydipsia, polyphagia, and polydipsia as OP  Is s/p aggressive volume resuscitaiton  DKA insulin gtt transitioned to non-DKA insulin gtt,  transitioned to SQ insulin regimen 6/19  Endocrinology consultation appreciated  Patient discharged on Lantus 36 units every morning, NovoLog 10 units 3 times daily with meals  Outpatient follow-up in 4 weeks  Nutrition consulted for diabetes education  Diabetic diet  Metabolic acidosis  In setting of DKA  Resolved  Cocaine use  Remote  Snorted cocaine prior to admission  Last use 1 year ago - encourage complete cessation  SIRS (systemic inflammatory response syndrome) (HCC)  POA AEB tachycardia and leukocytosis  Suspect this is reactive in setting of DKA  No evidence of infection at this time  HTN (hypertension)  Previously on Lisinopril and Lopressor, however has not been compliant w/these  Outpatient follow-up with PCP  Factor 5 Leiden mutation, heterozygous (HCC)  Has hx of unprovoked DVT/PE in 2019 for which he was started on Eliquis, however it does not appear he has continued to take this  Will need to discuss resuming AC w/patient   Patient restarted on Eliquis on 6/19/2025, will continue on discharge  Flank pain  Presented w/R-sided flank pain that radiates to scrotum  CT A/P w/out contrast w/no acute findings  Scrotum US WNL  UA w/2+ blood  Is s/p aggressive volume resuscitation   Continue scheduled  APAP/Toradol and PRN Oxycodone/Dilaudid  CT renal stone study negative  Pain now resolved  Obesity  Encourage healthy lifestyle modifications  Hypertriglyceridemia  TRIG 1249  Likely diet-related  Patient was started on Tricor and statin, this was continued on discharge  Low cholesterol/triglyceride diet  Newly diagnosed diabetes (HCC)  Lab Results   Component Value Date    HGBA1C 11.6 (H) 06/18/2025       Recent Labs     06/19/25  1600 06/19/25  2115 06/20/25  0726 06/20/25  1115   POCGLU 226* 218* 281* 179*       Blood Sugar Average: Last 72 hrs:  (P) 189.32       Medical Problems       Resolved Problems  Date Reviewed: 6/18/2025   None       Discharging Physician / Practitioner: Huseyin Wang PA-C  PCP: Isabelita Annulysse, CRNP  Admission Date:   Admission Orders (From admission, onward)       Ordered        06/18/25 0655  INPATIENT ADMISSION  Once                          Discharge Date: 06/20/25    Next Steps for Physician/AP Assuming Care:  Outpatient follow-up with endocrinology    Test Results Pending at Discharge (will require follow up):  Not applicable    Medication Changes for Discharge & Rationale:   Restarted Eliquis  Started Lantus and NovoLog  Started Tricor and statin  See after visit summary for reconciled discharge medications provided to patient and/or family.     Consultations During Hospital Stay:  Endocrinology    Procedures Performed:   Not applicable    Significant Findings / Test Results:   CT renal stone study abdomen pelvis wo contrast  Result Date: 6/19/2025  Impression: No urinary tract calculi. Stable hepatomegaly with steatosis. Workstation performed: [id number]     US scrotum and testicles  Result Date: 6/18/2025  Impression: Normal exam. Workstation performed: [id number]     CT abdomen pelvis wo contrast  Result Date: 6/18/2025  Impression: No acute findings in the abdomen or pelvis within the limits of unenhanced technique. Hepatomegaly with steatosis. Workstation performed:  [id number]       Incidental Findings:   none       Hospital Course:   Shun Linda is a 39 y.o. male patient who originally presented to the hospital on 6/18/2025 due to acute onset right sided pain radiating down to scrotum.  Please see H&P for complete details of presentation.  The patient underwent a scrotal ultrasound and a CT of the chest abdomen pelvis which was negative.  The patient was noted to be in DKA and was started on insulin bolus and admitted to critical care service.  The patient was started on a DKA insulin drip.  DKA quickly resolved and the patient was switched over to a non-DKA insulin drip.  Endocrine was consulted and the patient was transitioned to Lantus/Humalog.  The patient continued to have complaints of flank pain and a CT renal stone study was ordered and negative.  The patient's blood sugars improved.  The patient's pain resolved.  The patient was discharged on Lantus 36 units every morning and NovoLog 10 units 3 times daily with meals.  The patient was also restarted on Eliquis which the patient was previously on for factor V.  The patient had discontinued this on his own.  Eliquis was continued on discharge.  Patient was also noted to have elevated triglycerides of 1249.  The patient was started on Tricor and statin, this was continued on discharge.  The patient was instructed to follow-up with his PCP within 1 week.  He was also instructed to follow-up with endocrinology in 4 weeks.  This is a brief discharge summary.  Please see notes from throughout the patient's hospital stay for complete details of his hospitalization.      Please see above list of diagnoses and related plan for additional information.     Discharge Day Visit / Exam:   Subjective:    Vitals: Blood Pressure: 136/79 (06/20/25 0700)  Pulse: 95 (06/20/25 0700)  Temperature: 98.3 °F (36.8 °C) (06/20/25 0700)  Temp Source: Temporal (06/20/25 0700)  Respirations: 20 (06/20/25 0700)  Height: 6' (182.9 cm) (06/18/25  0903)  Weight - Scale: 125 kg (275 lb 9.2 oz) (06/20/25 0505)  SpO2: 97 % (06/20/25 0700)  Physical Exam  Vitals and nursing note reviewed.   Constitutional:       Appearance: Normal appearance.   HENT:      Head: Normocephalic and atraumatic.      Nose: Nose normal.      Mouth/Throat:      Mouth: Mucous membranes are moist.      Pharynx: Oropharynx is clear.     Cardiovascular:      Rate and Rhythm: Normal rate and regular rhythm.      Heart sounds: Normal heart sounds.   Pulmonary:      Effort: Pulmonary effort is normal.      Breath sounds: Normal breath sounds.   Abdominal:      General: There is no distension.      Palpations: Abdomen is soft.      Tenderness: There is no abdominal tenderness.     Skin:     General: Skin is warm and dry.     Neurological:      General: No focal deficit present.      Mental Status: He is alert and oriented to person, place, and time.     Psychiatric:         Mood and Affect: Mood normal.         Behavior: Behavior normal.         Thought Content: Thought content normal.          Discussion with Family: Patient declined call to .     Discharge instructions/Information to patient and family:   See after visit summary for information provided to patient and family.      Provisions for Follow-Up Care:  See after visit summary for information related to follow-up care and any pertinent home health orders.      Mobility at time of Discharge:   Basic Mobility Inpatient Raw Score: 24  JH-HLM Goal: 8: Walk 250 feet or more  JH-HLM Achieved: 8: Walk 250 feet ot more  HLM Goal achieved. Continue to encourage appropriate mobility.     Disposition:   Home    Planned Readmission: no    Administrative Statements   Discharge Statement:  I have spent a total time of 25 minutes in caring for this patient on the day of the visit/encounter. >30 minutes of time was spent on: Documenting in the medical record, Reviewing / ordering tests, medicine, procedures  , and Communicating with  other healthcare professionals .    **Please Note: This note may have been constructed using a voice recognition system**

## 2025-06-20 NOTE — ASSESSMENT & PLAN NOTE
POA AEB tachycardia and leukocytosis  Suspect this is reactive in setting of DKA  No evidence of infection at this time

## 2025-06-20 NOTE — CASE MANAGEMENT
Case Management Discharge Planning Note    Patient name Shun Linda  Location ICU 10/ICU 10-01 MRN [id number]  : 1985 Date 2025       Current Admission Date: 2025  Current Admission Diagnosis:DKA (diabetic ketoacidosis) (HCC)   Patient Active Problem List    Diagnosis Date Noted    Newly diagnosed diabetes (HCC) 2025    Metabolic acidosis 2025    DKA (diabetic ketoacidosis) (HCC) 2025    Cocaine use 2025    SIRS (systemic inflammatory response syndrome) (HCC) 2025    HTN (hypertension) 2025    Factor 5 Leiden mutation, heterozygous (HCC) 2025    Flank pain 2025    Obesity 2025    Hepatic steatosis 2025    Hypertriglyceridemia 2025      LOS (days): 2  Geometric Mean LOS (GMLOS) (days): 3  Days to GMLOS:0.5     OBJECTIVE:  Risk of Unplanned Readmission Score: 15.43         Current admission status: Inpatient   Preferred Pharmacy:   [id number] [id number] [facility] [address]  Phone: [phone] Fax: [phone]    [facility] [address] [address]  Phone: [phone] Fax: [phone]    Primary Care Provider: Isabelita Annulysse, CRNP    Primary Insurance: [employer]  Secondary Insurance:     DISCHARGE DETAILS:    Discharge planning discussed with:: malinda Robles was called at [phone] at 10:56 am     Comments - [employer]: priced check was compled for the patient -  pt denies any additional d/c needs -  pt & SO are in agreement  with the d/c & d/c plan  CM contacted family/caregiver?: Yes  Were Treatment Team discharge recommendations reviewed with patient/caregiver?: Yes  Did patient/caregiver verbalize understanding of patient care needs?: Yes  Were patient/caregiver advised of the risks associated with not following Treatment Team discharge  recommendations?: Yes    Contacts  Patient Contacts: Sonam  Relationship to Patient:: Other (Comment) (fiance)  Contact Method: Phone  Phone Number: [phone]  Reason/Outcome: Discharge Planning    Requested Home Health Care         Is the patient interested in HHC at discharge?: No    DME Referral Provided  Referral made for DME?: No    Other Referral/Resources/Interventions Provided:  Interventions: Prescription Price Check  Referral Comments: cm called [facility] at 13:30pm & 13:53 pm 14:58 pm-   pt;s insulin is $0.00 copay insulin supplies were ordered    Would you [facility] to [facility]?  : No - Declined    Treatment Team Recommendation: Home (home with parents & outpt follow up- family)  Expected Discharge Disposition: Home or Self Care     Transport at Discharge : Family                                   Family notified:: Sonam & father were called

## 2025-06-20 NOTE — ASSESSMENT & PLAN NOTE
Lab Results   Component Value Date    HGBA1C 11.6 (H) 06/18/2025       Recent Labs     06/19/25  1600 06/19/25  2115 06/20/25  0726 06/20/25  1115   POCGLU 226* 218* 281* 179*       Blood Sugar Average: Last 72 hrs:  (P) 189.32

## 2025-06-20 NOTE — PLAN OF CARE
Problem: METABOLIC, FLUID AND ELECTROLYTES - ADULT  Goal: Electrolytes maintained within normal limits  Description: INTERVENTIONS:  - Monitor labs and assess patient for signs and symptoms of electrolyte imbalances  - Administer electrolyte replacement as ordered  - Monitor response to electrolyte replacements, including repeat lab results as appropriate  - Instruct patient on fluid and nutrition as appropriate  Outcome: Progressing  Goal: Glucose maintained within target range  Description: INTERVENTIONS:  - Monitor Blood Glucose as ordered  - Assess for signs and symptoms of hyperglycemia and hypoglycemia  - Administer ordered medications to maintain glucose within target range  - Assess nutritional intake and initiate nutrition service referral as needed  Outcome: Progressing     Problem: PAIN - ADULT  Goal: Verbalizes/displays adequate comfort level or baseline comfort level  Description: Interventions:  - Encourage patient to monitor pain and request assistance  - Assess pain using appropriate pain scale  - Administer analgesics as ordered based on type and severity of pain and evaluate response  - Implement non-pharmacological measures as appropriate and evaluate response  - Consider cultural and social influences on pain and pain management  - Notify physician/advanced practitioner if interventions unsuccessful or patient reports new pain  - Educate patient/family on pain management process including their role and importance of  reporting pain   - Provide non-pharmacologic/complimentary pain relief interventions  Outcome: Progressing

## 2025-06-21 LAB — GAD65 AB SER-ACNC: <5 U/ML (ref 0–5)

## 2025-06-22 LAB
BACTERIA BLD CULT: NORMAL
BACTERIA BLD CULT: NORMAL

## 2025-06-23 LAB
BACTERIA BLD CULT: NORMAL
BACTERIA BLD CULT: NORMAL
PANC ISLET CELL AB TITR SER: NEGATIVE {TITER}

## 2025-07-02 ENCOUNTER — OFFICE VISIT (OUTPATIENT)
Dept: FAMILY MEDICINE CLINIC | Facility: CLINIC | Age: 40
End: 2025-07-02
Payer: COMMERCIAL

## 2025-07-02 VITALS
SYSTOLIC BLOOD PRESSURE: 128 MMHG | OXYGEN SATURATION: 99 % | TEMPERATURE: 98.2 F | HEART RATE: 90 BPM | BODY MASS INDEX: 36.45 KG/M2 | HEIGHT: 73 IN | WEIGHT: 275 LBS | DIASTOLIC BLOOD PRESSURE: 66 MMHG

## 2025-07-02 DIAGNOSIS — F20.9 SCHIZOPHRENIA, UNSPECIFIED TYPE (HCC): ICD-10-CM

## 2025-07-02 DIAGNOSIS — I10 PRIMARY HYPERTENSION: Chronic | ICD-10-CM

## 2025-07-02 DIAGNOSIS — E11.9 NEWLY DIAGNOSED DIABETES (HCC): ICD-10-CM

## 2025-07-02 DIAGNOSIS — Z76.89 ENCOUNTER TO ESTABLISH CARE WITH NEW DOCTOR: ICD-10-CM

## 2025-07-02 DIAGNOSIS — F41.9 ANXIETY: ICD-10-CM

## 2025-07-02 DIAGNOSIS — I27.82 OTHER CHRONIC PULMONARY EMBOLISM, UNSPECIFIED WHETHER ACUTE COR PULMONALE PRESENT (HCC): ICD-10-CM

## 2025-07-02 DIAGNOSIS — E11.9 DIABETES (HCC): ICD-10-CM

## 2025-07-02 DIAGNOSIS — E78.1 HYPERTRIGLYCERIDEMIA: ICD-10-CM

## 2025-07-02 DIAGNOSIS — Z13.220 SCREENING FOR HYPERLIPIDEMIA: ICD-10-CM

## 2025-07-02 DIAGNOSIS — F31.62 BIPOLAR AFFECTIVE DISORDER, MIXED, MODERATE (HCC): Primary | ICD-10-CM

## 2025-07-02 DIAGNOSIS — D68.51 FACTOR 5 LEIDEN MUTATION, HETEROZYGOUS (HCC): Chronic | ICD-10-CM

## 2025-07-02 PROBLEM — I26.99 PULMONARY EMBOLUS (HCC): Status: ACTIVE | Noted: 2019-09-18

## 2025-07-02 PROCEDURE — 99204 OFFICE O/P NEW MOD 45 MIN: CPT | Performed by: FAMILY MEDICINE

## 2025-07-02 RX ORDER — METOPROLOL SUCCINATE 25 MG/1
25 TABLET, EXTENDED RELEASE ORAL DAILY
Qty: 90 TABLET | Refills: 0 | Status: SHIPPED | OUTPATIENT
Start: 2025-07-02

## 2025-07-02 RX ORDER — FENOFIBRATE 145 MG/1
145 TABLET, FILM COATED ORAL DAILY
Qty: 90 TABLET | Refills: 0 | Status: SHIPPED | OUTPATIENT
Start: 2025-07-02

## 2025-07-02 RX ORDER — LISINOPRIL 5 MG/1
5 TABLET ORAL DAILY
Qty: 90 TABLET | Refills: 0 | Status: SHIPPED | OUTPATIENT
Start: 2025-07-02

## 2025-07-02 RX ORDER — INSULIN LISPRO 100 [IU]/ML
INJECTION, SOLUTION SUBCUTANEOUS
COMMUNITY
Start: 2025-06-20

## 2025-07-02 RX ORDER — BLOOD SUGAR DIAGNOSTIC
STRIP MISCELLANEOUS
Qty: 600 EACH | Refills: 3 | Status: SHIPPED | OUTPATIENT
Start: 2025-07-02

## 2025-07-02 RX ORDER — DIVALPROEX SODIUM 500 MG/1
1500 TABLET, FILM COATED, EXTENDED RELEASE ORAL
Qty: 270 TABLET | Refills: 0 | Status: SHIPPED | OUTPATIENT
Start: 2025-07-02

## 2025-07-02 RX ORDER — ATORVASTATIN CALCIUM 40 MG/1
40 TABLET, FILM COATED ORAL
Qty: 90 TABLET | Refills: 1 | Status: SHIPPED | OUTPATIENT
Start: 2025-07-02

## 2025-07-13 NOTE — PROGRESS NOTES
Name: Shun Linda      : 1985      MRN: [id number]  Encounter Provider: Luis Fernando Pearce MD  Encounter Date: 2025   Encounter department: [facility] [place]    :  Assessment & Plan  Bipolar affective disorder, mixed, moderate (HCC)    Orders:    divalproex sodium (DEPAKOTE ER) 500 mg 24 hr tablet; Take 3 tablets (1,500 mg total) by mouth daily at bedtime    Anxiety    Orders:    Ambulatory referral to Psych Services; Future    Diabetes (HCC)    Lab Results   Component Value Date    HGBA1C 11.6 (H) 2025       Orders:    glucose blood (OneTouch Verio) test strip; May substitute brand based on insurance coverage. Check glucose ACHS.    fenofibrate (TRICOR) 145 mg tablet; Take 1 tablet (145 mg total) by mouth daily    Lipid panel; Future    Comprehensive metabolic panel; Future    Magnesium; Future    Phosphorus; Future    TSH, 3rd generation with Free T4 reflex; Future    CBC and differential; Future    Factor 5 Leiden mutation, heterozygous (HCC)    Orders:    apixaban (ELIQUIS) 5 mg; Take 1 tablet (5 mg total) by mouth 2 (two) times a day    Hypertriglyceridemia    Orders:    atorvastatin (LIPITOR) 40 mg tablet; Take 1 tablet (40 mg total) by mouth daily with dinner    fenofibrate (TRICOR) 145 mg tablet; Take 1 tablet (145 mg total) by mouth daily    Primary hypertension    Orders:    lisinopril (ZESTRIL) 5 mg tablet; Take 1 tablet (5 mg total) by mouth daily    metoprolol succinate (TOPROL-XL) 25 mg 24 hr tablet; Take 1 tablet (25 mg total) by mouth daily    Screening for hyperlipidemia    Orders:    Lipid panel; Future    Other chronic pulmonary embolism, unspecified whether acute cor pulmonale present (HCC)         Schizophrenia, unspecified type (HCC)         Newly diagnosed diabetes (HCC)    Lab Results   Component Value Date    HGBA1C 11.6 (H) 2025            Encounter to establish care with new doctor           Assessment & Plan  1. Factor 5 mutation  -Hx of PE  and DVT   - History of clotting disorder with previous use of blood thinners, but has been of of meds. Restarted at discharge .   - History of deep vein thrombosis in the leg and multiple blood clots in the right lung.  - Prescription for Eliquis 90-day supply will be provided.  - Monitoring for recurrence of symptoms and adherence to medication.    2. Diabetes mellitus.  - Recently admitted with DKA and new diabetes diagnosis   - Currently taking 10 units of insulin with meals and 36 units of long-acting insulin daily.  - Prescription for 600 test strips with 3 refills will be provided and sent to pharmacy.  - Appointment with endocrinologist scheduled in approximately 3 weeks to manage diabetes.  - A1c levels to be rechecked every 3 months, next check due in September or October 2025.    3. Hypertension.  - Blood pressure readings within normal range today.  - Current dosage of lisinopril 5 mg will be maintained.  - Monitoring for any changes in blood pressure and adjustment of dosage if necessary.  - Evaluation of medication effectiveness and response to treatment.    4. Anxiety.  - History of anxiety and bipolar disorder, diagnosed at age 9.  - Previously on Depakote, not taken since moving from [place]  - Reports intermittent symptoms of schizophrenia, including auditory hallucinations.  - Prescription for Depakote will be provided and referral to psychiatry will be made.    5. Hypertriglyceridemia.  - Currently taking fenofibrate for triglycerides.  - Prescription for fenofibrate will be provided.  - Lipid panel and comprehensive panel will be ordered.  - Labs to be repeated in approximately 6 weeks to monitor triglyceride levels.    Follow-up  - Follow-up visit scheduled for the first or second week of August 2025.           History of Present Illness     History of Present Illness  The patient presents to [facility] care.    He has a history of clotting disorder and was previously on blood thinners, which he  "has since discontinued due to running out of medication. He also has a history of deep vein thrombosis in his leg and multiple blood clots in his right lung. He is currently on Eliquis.    He has been diagnosed with diabetes and is seeking a prescription for test strips. His last blood sugar reading was 153, and he has not recorded any readings above 200 recently. The highest reading he has observed is between 170 and 180. He is currently taking 10 units of insulin with meals and 36 units of long-acting insulin daily.    He has a history of high blood pressure and was previously on metoprolol and lisinopril, but these medications were discontinued due to supply issues. He is now back on lisinopril, but at a reduced dose of 5 mg instead of the usual 40 mg. He is also taking metoprolol for his blood pressure.    He has a history of anxiety and bipolar disorder, which was diagnosed when he was 9 years old. He has been on Depakote since then but has not taken it since moving from [place].  He has not seen a psychiatrist for about a year and is not currently on any psychiatric medications. He reports intermittent symptoms of schizophrenia, including auditory hallucinations and feelings of being surrounded by dogs. He is aware that these experiences are not real. He is interested in resuming psychiatric care and is considering using [facility] for this purpose. He has limited transportation options.    He is currently taking fenofibrate for his triglycerides.    FAMILY HISTORY  His dad had a stroke recently, approximately 6 months to a year ago.     Review of Systems   All other systems reviewed and are negative.    Objective   /66   Pulse 90   Temp 98.2 °F (36.8 °C) (Tympanic)   Ht 6' 1\" (1.854 m)   Wt 125 kg (275 lb)   SpO2 99%   BMI 36.28 kg/m²     Physical Exam  General: No acute distress.  Neurological: Alert and oriented.  Cardiovascular: Blood pressure is good.  Physical Exam  Vitals and nursing note " reviewed.   Constitutional:       General: He is not in acute distress.     Appearance: Normal appearance. He is well-developed. He is not ill-appearing, toxic-appearing or diaphoretic.   HENT:      Head: Normocephalic and atraumatic.     Eyes:      General:         Right eye: No discharge.         Left eye: No discharge.      Extraocular Movements: Extraocular movements intact.      Conjunctiva/sclera: Conjunctivae normal.       Cardiovascular:      Rate and Rhythm: Normal rate.      Pulses:           Dorsalis pedis pulses are 2+ on the right side and 2+ on the left side.        Posterior tibial pulses are 2+ on the right side and 2+ on the left side.   Pulmonary:      Effort: Pulmonary effort is normal.     Musculoskeletal:      Cervical back: Normal range of motion and neck supple.   Feet:      Right foot:      Skin integrity: No ulcer, skin breakdown, erythema, warmth, callus or dry skin.      Left foot:      Skin integrity: No ulcer, skin breakdown, erythema, warmth, callus or dry skin.     Skin:     General: Skin is dry.      Capillary Refill: Capillary refill takes less than 2 seconds.     Neurological:      Mental Status: He is alert and oriented to person, place, and time.     Psychiatric:         Mood and Affect: Mood normal.         Behavior: Behavior normal.         Thought Content: Thought content normal.         Judgment: Judgment normal.

## 2025-07-13 NOTE — ASSESSMENT & PLAN NOTE
Orders:    lisinopril (ZESTRIL) 5 mg tablet; Take 1 tablet (5 mg total) by mouth daily    metoprolol succinate (TOPROL-XL) 25 mg 24 hr tablet; Take 1 tablet (25 mg total) by mouth daily

## 2025-07-13 NOTE — ASSESSMENT & PLAN NOTE
Orders:    atorvastatin (LIPITOR) 40 mg tablet; Take 1 tablet (40 mg total) by mouth daily with dinner    fenofibrate (TRICOR) 145 mg tablet; Take 1 tablet (145 mg total) by mouth daily

## 2025-07-13 NOTE — ASSESSMENT & PLAN NOTE
Orders:    divalproex sodium (DEPAKOTE ER) 500 mg 24 hr tablet; Take 3 tablets (1,500 mg total) by mouth daily at bedtime

## 2025-07-14 DIAGNOSIS — E11.9 DIABETES (HCC): ICD-10-CM

## 2025-07-14 RX ORDER — PEN NEEDLE, DIABETIC 32GX 5/32"
NEEDLE, DISPOSABLE MISCELLANEOUS
Qty: 100 EACH | Refills: 1 | Status: SHIPPED | OUTPATIENT
Start: 2025-07-14

## 2025-07-14 NOTE — TELEPHONE ENCOUNTER
Reason for call:   [x] Refill   [] Prior Auth  [] Other:     Office:   [x] PCP/Provider -   [] Specialty/Provider -     Medication: Insulin Pen Needle (BD Pen Needle Magda 2nd Gen) 32G X 4 MM MISC     Dose/Frequency: For use with insulin pen.     Quantity: 100    Pharmacy: [facility] - [address] [place] [address] Pharmacy   Does the patient have enough for 3 days?   [] Yes   [x] No - Send as HP to POD    Mail Away Pharmacy   Does the patient have enough for 10 days?   [] Yes   [] No - Send as HP to POD

## 2025-07-19 PROBLEM — E11.10 DKA (DIABETIC KETOACIDOSIS) (HCC): Status: RESOLVED | Noted: 2025-06-18 | Resolved: 2025-07-19

## 2025-07-28 ENCOUNTER — TELEPHONE (OUTPATIENT)
Dept: ENDOCRINOLOGY | Facility: CLINIC | Age: 40
End: 2025-07-28

## 2025-07-31 DIAGNOSIS — E11.9 DIABETES (HCC): ICD-10-CM

## 2025-08-01 DIAGNOSIS — E11.9 DIABETES (HCC): ICD-10-CM

## 2025-08-01 RX ORDER — INSULIN GLARGINE 100 [IU]/ML
36 INJECTION, SOLUTION SUBCUTANEOUS DAILY
Qty: 10.8 ML | Refills: 0 | Status: SHIPPED | OUTPATIENT
Start: 2025-08-01

## 2025-08-04 RX ORDER — INSULIN ASPART 100 [IU]/ML
10 INJECTION, SOLUTION INTRAVENOUS; SUBCUTANEOUS
Qty: 15 ML | Refills: 0 | Status: SHIPPED | OUTPATIENT
Start: 2025-08-04

## 2025-08-06 DIAGNOSIS — F31.62 BIPOLAR AFFECTIVE DISORDER, MIXED, MODERATE (HCC): ICD-10-CM

## 2025-08-08 RX ORDER — DIVALPROEX SODIUM 500 MG/1
1500 TABLET, FILM COATED, EXTENDED RELEASE ORAL
Qty: 270 TABLET | Refills: 0 | Status: SHIPPED | OUTPATIENT
Start: 2025-08-08

## 2025-08-18 ENCOUNTER — OFFICE VISIT (OUTPATIENT)
Dept: ENDOCRINOLOGY | Facility: CLINIC | Age: 40
End: 2025-08-18
Payer: COMMERCIAL

## 2025-08-18 VITALS
RESPIRATION RATE: 18 BRPM | WEIGHT: 263.6 LBS | SYSTOLIC BLOOD PRESSURE: 138 MMHG | TEMPERATURE: 97.6 F | HEIGHT: 73 IN | HEART RATE: 92 BPM | OXYGEN SATURATION: 98 % | BODY MASS INDEX: 34.94 KG/M2 | DIASTOLIC BLOOD PRESSURE: 70 MMHG

## 2025-08-18 DIAGNOSIS — Z79.4 TYPE 2 DIABETES MELLITUS WITH HYPERGLYCEMIA, WITH LONG-TERM CURRENT USE OF INSULIN (HCC): Primary | ICD-10-CM

## 2025-08-18 DIAGNOSIS — E11.9 DIABETES (HCC): ICD-10-CM

## 2025-08-18 DIAGNOSIS — E11.65 TYPE 2 DIABETES MELLITUS WITH HYPERGLYCEMIA, WITH LONG-TERM CURRENT USE OF INSULIN (HCC): Primary | ICD-10-CM

## 2025-08-18 DIAGNOSIS — I10 PRIMARY HYPERTENSION: Chronic | ICD-10-CM

## 2025-08-18 DIAGNOSIS — E78.2 MIXED HYPERLIPIDEMIA: ICD-10-CM

## 2025-08-18 LAB — SL AMB POCT HEMOGLOBIN AIC: 6.9 (ref ?–6.5)

## 2025-08-18 PROCEDURE — 83036 HEMOGLOBIN GLYCOSYLATED A1C: CPT | Performed by: STUDENT IN AN ORGANIZED HEALTH CARE EDUCATION/TRAINING PROGRAM

## 2025-08-18 PROCEDURE — 99214 OFFICE O/P EST MOD 30 MIN: CPT | Performed by: STUDENT IN AN ORGANIZED HEALTH CARE EDUCATION/TRAINING PROGRAM

## 2025-08-18 RX ORDER — HYDROCHLOROTHIAZIDE 12.5 MG/1
CAPSULE ORAL
Qty: 6 EACH | Refills: 2 | Status: SHIPPED | OUTPATIENT
Start: 2025-08-18

## 2025-08-18 RX ORDER — INSULIN GLARGINE 100 [IU]/ML
36 INJECTION, SOLUTION SUBCUTANEOUS DAILY
Qty: 33 ML | Refills: 1 | Status: SHIPPED | OUTPATIENT
Start: 2025-08-18

## 2025-08-18 RX ORDER — KETOROLAC TROMETHAMINE 30 MG/ML
INJECTION, SOLUTION INTRAMUSCULAR; INTRAVENOUS
Qty: 1 EACH | Refills: 1 | Status: SHIPPED | OUTPATIENT
Start: 2025-08-18

## 2025-08-18 RX ORDER — INSULIN ASPART 100 [IU]/ML
10 INJECTION, SOLUTION INTRAVENOUS; SUBCUTANEOUS
Qty: 45 ML | Refills: 1 | Status: SHIPPED | OUTPATIENT
Start: 2025-08-18

## 2025-08-18 RX ORDER — KETOROLAC TROMETHAMINE 30 MG/ML
INJECTION, SOLUTION INTRAMUSCULAR; INTRAVENOUS
Qty: 1 EACH | Refills: 1 | Status: SHIPPED | OUTPATIENT
Start: 2025-08-18 | End: 2025-08-18

## 2025-08-20 DIAGNOSIS — E78.1 HYPERTRIGLYCERIDEMIA: ICD-10-CM

## 2025-08-20 DIAGNOSIS — E11.9 DIABETES (HCC): ICD-10-CM

## 2025-08-22 RX ORDER — FENOFIBRATE 145 MG/1
145 TABLET, FILM COATED ORAL DAILY
Qty: 90 TABLET | Refills: 0 | Status: SHIPPED | OUTPATIENT
Start: 2025-08-22